# Patient Record
Sex: MALE | Race: OTHER | NOT HISPANIC OR LATINO | URBAN - METROPOLITAN AREA
[De-identification: names, ages, dates, MRNs, and addresses within clinical notes are randomized per-mention and may not be internally consistent; named-entity substitution may affect disease eponyms.]

---

## 2019-09-11 ENCOUNTER — INPATIENT (INPATIENT)
Facility: HOSPITAL | Age: 79
LOS: 8 days | Discharge: HOSPICE | End: 2019-09-20
Attending: INTERNAL MEDICINE | Admitting: INTERNAL MEDICINE
Payer: MEDICARE

## 2019-09-11 VITALS
TEMPERATURE: 98 F | RESPIRATION RATE: 24 BRPM | SYSTOLIC BLOOD PRESSURE: 122 MMHG | DIASTOLIC BLOOD PRESSURE: 60 MMHG | HEART RATE: 88 BPM | OXYGEN SATURATION: 97 %

## 2019-09-11 DIAGNOSIS — I10 ESSENTIAL (PRIMARY) HYPERTENSION: ICD-10-CM

## 2019-09-11 DIAGNOSIS — E11.9 TYPE 2 DIABETES MELLITUS WITHOUT COMPLICATIONS: ICD-10-CM

## 2019-09-11 DIAGNOSIS — R53.2 FUNCTIONAL QUADRIPLEGIA: ICD-10-CM

## 2019-09-11 DIAGNOSIS — N17.9 ACUTE KIDNEY FAILURE, UNSPECIFIED: ICD-10-CM

## 2019-09-11 DIAGNOSIS — R41.82 ALTERED MENTAL STATUS, UNSPECIFIED: ICD-10-CM

## 2019-09-11 DIAGNOSIS — R74.8 ABNORMAL LEVELS OF OTHER SERUM ENZYMES: ICD-10-CM

## 2019-09-11 DIAGNOSIS — G93.41 METABOLIC ENCEPHALOPATHY: ICD-10-CM

## 2019-09-11 DIAGNOSIS — F03.90 UNSPECIFIED DEMENTIA WITHOUT BEHAVIORAL DISTURBANCE: ICD-10-CM

## 2019-09-11 DIAGNOSIS — J18.9 PNEUMONIA, UNSPECIFIED ORGANISM: ICD-10-CM

## 2019-09-11 DIAGNOSIS — Z71.89 OTHER SPECIFIED COUNSELING: ICD-10-CM

## 2019-09-11 DIAGNOSIS — Z29.9 ENCOUNTER FOR PROPHYLACTIC MEASURES, UNSPECIFIED: ICD-10-CM

## 2019-09-11 DIAGNOSIS — R62.7 ADULT FAILURE TO THRIVE: ICD-10-CM

## 2019-09-11 DIAGNOSIS — D64.9 ANEMIA, UNSPECIFIED: ICD-10-CM

## 2019-09-11 DIAGNOSIS — R13.10 DYSPHAGIA, UNSPECIFIED: ICD-10-CM

## 2019-09-11 LAB
ALBUMIN SERPL ELPH-MCNC: 3 G/DL — LOW (ref 3.3–5)
ALP SERPL-CCNC: 78 U/L — SIGNIFICANT CHANGE UP (ref 40–120)
ALT FLD-CCNC: 10 U/L — SIGNIFICANT CHANGE UP (ref 4–41)
ANION GAP SERPL CALC-SCNC: 15 MMO/L — HIGH (ref 7–14)
APPEARANCE UR: CLEAR — SIGNIFICANT CHANGE UP
APTT BLD: 23.5 SEC — LOW (ref 27.5–36.3)
AST SERPL-CCNC: 11 U/L — SIGNIFICANT CHANGE UP (ref 4–40)
B PERT DNA SPEC QL NAA+PROBE: NOT DETECTED — SIGNIFICANT CHANGE UP
BACTERIA # UR AUTO: NEGATIVE — SIGNIFICANT CHANGE UP
BASE EXCESS BLDV CALC-SCNC: -4.6 MMOL/L — SIGNIFICANT CHANGE UP
BASOPHILS # BLD AUTO: 0.03 K/UL — SIGNIFICANT CHANGE UP (ref 0–0.2)
BASOPHILS NFR BLD AUTO: 0.3 % — SIGNIFICANT CHANGE UP (ref 0–2)
BILIRUB SERPL-MCNC: < 0.2 MG/DL — LOW (ref 0.2–1.2)
BILIRUB UR-MCNC: NEGATIVE — SIGNIFICANT CHANGE UP
BLOOD GAS VENOUS - CREATININE: 3.81 MG/DL — HIGH (ref 0.5–1.3)
BLOOD GAS VENOUS - FIO2: 21 — SIGNIFICANT CHANGE UP
BLOOD UR QL VISUAL: NEGATIVE — SIGNIFICANT CHANGE UP
BUN SERPL-MCNC: 70 MG/DL — HIGH (ref 7–23)
C PNEUM DNA SPEC QL NAA+PROBE: NOT DETECTED — SIGNIFICANT CHANGE UP
CALCIUM SERPL-MCNC: 8.5 MG/DL — SIGNIFICANT CHANGE UP (ref 8.4–10.5)
CHLORIDE BLDV-SCNC: 107 MMOL/L — SIGNIFICANT CHANGE UP (ref 96–108)
CHLORIDE SERPL-SCNC: 98 MMOL/L — SIGNIFICANT CHANGE UP (ref 98–107)
CO2 SERPL-SCNC: 19 MMOL/L — LOW (ref 22–31)
COLOR SPEC: YELLOW — SIGNIFICANT CHANGE UP
CREAT SERPL-MCNC: 3.8 MG/DL — HIGH (ref 0.5–1.3)
EOSINOPHIL # BLD AUTO: 0.02 K/UL — SIGNIFICANT CHANGE UP (ref 0–0.5)
EOSINOPHIL NFR BLD AUTO: 0.2 % — SIGNIFICANT CHANGE UP (ref 0–6)
FLUAV H1 2009 PAND RNA SPEC QL NAA+PROBE: NOT DETECTED — SIGNIFICANT CHANGE UP
FLUAV H1 RNA SPEC QL NAA+PROBE: NOT DETECTED — SIGNIFICANT CHANGE UP
FLUAV H3 RNA SPEC QL NAA+PROBE: NOT DETECTED — SIGNIFICANT CHANGE UP
FLUAV SUBTYP SPEC NAA+PROBE: NOT DETECTED — SIGNIFICANT CHANGE UP
FLUBV RNA SPEC QL NAA+PROBE: NOT DETECTED — SIGNIFICANT CHANGE UP
GAS PNL BLDV: 130 MMOL/L — LOW (ref 136–146)
GLUCOSE BLDC GLUCOMTR-MCNC: 142 MG/DL — HIGH (ref 70–99)
GLUCOSE BLDC GLUCOMTR-MCNC: 159 MG/DL — HIGH (ref 70–99)
GLUCOSE BLDC GLUCOMTR-MCNC: 189 MG/DL — HIGH (ref 70–99)
GLUCOSE BLDC GLUCOMTR-MCNC: 192 MG/DL — HIGH (ref 70–99)
GLUCOSE BLDC GLUCOMTR-MCNC: 227 MG/DL — HIGH (ref 70–99)
GLUCOSE BLDV-MCNC: 228 MG/DL — HIGH (ref 70–99)
GLUCOSE SERPL-MCNC: 252 MG/DL — HIGH (ref 70–99)
GLUCOSE UR-MCNC: 70 — SIGNIFICANT CHANGE UP
HADV DNA SPEC QL NAA+PROBE: NOT DETECTED — SIGNIFICANT CHANGE UP
HCO3 BLDV-SCNC: 20 MMOL/L — SIGNIFICANT CHANGE UP (ref 20–27)
HCOV PNL SPEC NAA+PROBE: SIGNIFICANT CHANGE UP
HCT VFR BLD CALC: 29.2 % — LOW (ref 39–50)
HCT VFR BLDV CALC: 29.3 % — LOW (ref 39–51)
HGB BLD-MCNC: 9.3 G/DL — LOW (ref 13–17)
HGB BLDV-MCNC: 9.5 G/DL — LOW (ref 13–17)
HMPV RNA SPEC QL NAA+PROBE: NOT DETECTED — SIGNIFICANT CHANGE UP
HPIV1 RNA SPEC QL NAA+PROBE: NOT DETECTED — SIGNIFICANT CHANGE UP
HPIV2 RNA SPEC QL NAA+PROBE: NOT DETECTED — SIGNIFICANT CHANGE UP
HPIV3 RNA SPEC QL NAA+PROBE: NOT DETECTED — SIGNIFICANT CHANGE UP
HPIV4 RNA SPEC QL NAA+PROBE: NOT DETECTED — SIGNIFICANT CHANGE UP
HYALINE CASTS # UR AUTO: NEGATIVE — SIGNIFICANT CHANGE UP
IMM GRANULOCYTES NFR BLD AUTO: 0.3 % — SIGNIFICANT CHANGE UP (ref 0–1.5)
INR BLD: 1.05 — SIGNIFICANT CHANGE UP (ref 0.88–1.17)
KETONES UR-MCNC: NEGATIVE — SIGNIFICANT CHANGE UP
LACTATE BLDV-MCNC: 1.6 MMOL/L — SIGNIFICANT CHANGE UP (ref 0.5–2)
LEUKOCYTE ESTERASE UR-ACNC: NEGATIVE — SIGNIFICANT CHANGE UP
LYMPHOCYTES # BLD AUTO: 2.02 K/UL — SIGNIFICANT CHANGE UP (ref 1–3.3)
LYMPHOCYTES # BLD AUTO: 21.8 % — SIGNIFICANT CHANGE UP (ref 13–44)
MCHC RBC-ENTMCNC: 27.5 PG — SIGNIFICANT CHANGE UP (ref 27–34)
MCHC RBC-ENTMCNC: 31.8 % — LOW (ref 32–36)
MCV RBC AUTO: 86.4 FL — SIGNIFICANT CHANGE UP (ref 80–100)
MONOCYTES # BLD AUTO: 0.96 K/UL — HIGH (ref 0–0.9)
MONOCYTES NFR BLD AUTO: 10.4 % — SIGNIFICANT CHANGE UP (ref 2–14)
NEUTROPHILS # BLD AUTO: 6.2 K/UL — SIGNIFICANT CHANGE UP (ref 1.8–7.4)
NEUTROPHILS NFR BLD AUTO: 67 % — SIGNIFICANT CHANGE UP (ref 43–77)
NITRITE UR-MCNC: NEGATIVE — SIGNIFICANT CHANGE UP
NRBC # FLD: 0 K/UL — SIGNIFICANT CHANGE UP (ref 0–0)
NT-PROBNP SERPL-SCNC: 1191 PG/ML — SIGNIFICANT CHANGE UP
PCO2 BLDV: 40 MMHG — LOW (ref 41–51)
PH BLDV: 7.33 PH — SIGNIFICANT CHANGE UP (ref 7.32–7.43)
PH UR: 6 — SIGNIFICANT CHANGE UP (ref 5–8)
PLATELET # BLD AUTO: 328 K/UL — SIGNIFICANT CHANGE UP (ref 150–400)
PMV BLD: 9.8 FL — SIGNIFICANT CHANGE UP (ref 7–13)
PO2 BLDV: 28 MMHG — LOW (ref 35–40)
POTASSIUM BLDV-SCNC: 4.1 MMOL/L — SIGNIFICANT CHANGE UP (ref 3.4–4.5)
POTASSIUM SERPL-MCNC: 4.4 MMOL/L — SIGNIFICANT CHANGE UP (ref 3.5–5.3)
POTASSIUM SERPL-SCNC: 4.4 MMOL/L — SIGNIFICANT CHANGE UP (ref 3.5–5.3)
PROT SERPL-MCNC: 6.1 G/DL — SIGNIFICANT CHANGE UP (ref 6–8.3)
PROT UR-MCNC: 200 — HIGH
PROTHROM AB SERPL-ACNC: 11.7 SEC — SIGNIFICANT CHANGE UP (ref 9.8–13.1)
RBC # BLD: 3.38 M/UL — LOW (ref 4.2–5.8)
RBC # FLD: 13.4 % — SIGNIFICANT CHANGE UP (ref 10.3–14.5)
RBC CASTS # UR COMP ASSIST: SIGNIFICANT CHANGE UP (ref 0–?)
RSV RNA SPEC QL NAA+PROBE: NOT DETECTED — SIGNIFICANT CHANGE UP
RV+EV RNA SPEC QL NAA+PROBE: NOT DETECTED — SIGNIFICANT CHANGE UP
SAO2 % BLDV: 41.8 % — LOW (ref 60–85)
SODIUM SERPL-SCNC: 132 MMOL/L — LOW (ref 135–145)
SP GR SPEC: 1.01 — SIGNIFICANT CHANGE UP (ref 1–1.04)
SQUAMOUS # UR AUTO: SIGNIFICANT CHANGE UP
TROPONIN T, HIGH SENSITIVITY: 120 NG/L — CRITICAL HIGH (ref ?–14)
TROPONIN T, HIGH SENSITIVITY: 131 NG/L — CRITICAL HIGH (ref ?–14)
UROBILINOGEN FLD QL: NORMAL — SIGNIFICANT CHANGE UP
WBC # BLD: 9.26 K/UL — SIGNIFICANT CHANGE UP (ref 3.8–10.5)
WBC # FLD AUTO: 9.26 K/UL — SIGNIFICANT CHANGE UP (ref 3.8–10.5)
WBC UR QL: SIGNIFICANT CHANGE UP (ref 0–?)

## 2019-09-11 PROCEDURE — 99223 1ST HOSP IP/OBS HIGH 75: CPT | Mod: GC

## 2019-09-11 PROCEDURE — 70450 CT HEAD/BRAIN W/O DYE: CPT | Mod: 26

## 2019-09-11 PROCEDURE — 99233 SBSQ HOSP IP/OBS HIGH 50: CPT | Mod: GC,25

## 2019-09-11 PROCEDURE — 71045 X-RAY EXAM CHEST 1 VIEW: CPT | Mod: 26

## 2019-09-11 RX ORDER — ONDANSETRON 8 MG/1
4 TABLET, FILM COATED ORAL EVERY 6 HOURS
Refills: 0 | Status: DISCONTINUED | OUTPATIENT
Start: 2019-09-11 | End: 2019-09-20

## 2019-09-11 RX ORDER — FUROSEMIDE 40 MG
20 TABLET ORAL ONCE
Refills: 0 | Status: COMPLETED | OUTPATIENT
Start: 2019-09-11 | End: 2019-09-11

## 2019-09-11 RX ORDER — SODIUM CHLORIDE 9 MG/ML
500 INJECTION INTRAMUSCULAR; INTRAVENOUS; SUBCUTANEOUS ONCE
Refills: 0 | Status: COMPLETED | OUTPATIENT
Start: 2019-09-11 | End: 2019-09-11

## 2019-09-11 RX ORDER — IPRATROPIUM/ALBUTEROL SULFATE 18-103MCG
3 AEROSOL WITH ADAPTER (GRAM) INHALATION EVERY 6 HOURS
Refills: 0 | Status: DISCONTINUED | OUTPATIENT
Start: 2019-09-11 | End: 2019-09-20

## 2019-09-11 RX ORDER — PIPERACILLIN AND TAZOBACTAM 4; .5 G/20ML; G/20ML
3.38 INJECTION, POWDER, LYOPHILIZED, FOR SOLUTION INTRAVENOUS ONCE
Refills: 0 | Status: COMPLETED | OUTPATIENT
Start: 2019-09-11 | End: 2019-09-11

## 2019-09-11 RX ORDER — HEPARIN SODIUM 5000 [USP'U]/ML
5000 INJECTION INTRAVENOUS; SUBCUTANEOUS EVERY 8 HOURS
Refills: 0 | Status: DISCONTINUED | OUTPATIENT
Start: 2019-09-11 | End: 2019-09-20

## 2019-09-11 RX ORDER — PANTOPRAZOLE SODIUM 20 MG/1
40 TABLET, DELAYED RELEASE ORAL DAILY
Refills: 0 | Status: DISCONTINUED | OUTPATIENT
Start: 2019-09-11 | End: 2019-09-20

## 2019-09-11 RX ORDER — SODIUM CHLORIDE 9 MG/ML
1000 INJECTION, SOLUTION INTRAVENOUS
Refills: 0 | Status: DISCONTINUED | OUTPATIENT
Start: 2019-09-11 | End: 2019-09-20

## 2019-09-11 RX ORDER — SODIUM CHLORIDE 9 MG/ML
1000 INJECTION INTRAMUSCULAR; INTRAVENOUS; SUBCUTANEOUS
Refills: 0 | Status: DISCONTINUED | OUTPATIENT
Start: 2019-09-11 | End: 2019-09-11

## 2019-09-11 RX ORDER — DEXTROSE 50 % IN WATER 50 %
15 SYRINGE (ML) INTRAVENOUS ONCE
Refills: 0 | Status: DISCONTINUED | OUTPATIENT
Start: 2019-09-11 | End: 2019-09-20

## 2019-09-11 RX ORDER — INSULIN LISPRO 100/ML
VIAL (ML) SUBCUTANEOUS EVERY 6 HOURS
Refills: 0 | Status: DISCONTINUED | OUTPATIENT
Start: 2019-09-11 | End: 2019-09-20

## 2019-09-11 RX ORDER — DEXAMETHASONE 0.5 MG/5ML
8 ELIXIR ORAL EVERY 8 HOURS
Refills: 0 | Status: COMPLETED | OUTPATIENT
Start: 2019-09-11 | End: 2019-09-12

## 2019-09-11 RX ORDER — DEXTROSE 50 % IN WATER 50 %
25 SYRINGE (ML) INTRAVENOUS ONCE
Refills: 0 | Status: DISCONTINUED | OUTPATIENT
Start: 2019-09-11 | End: 2019-09-20

## 2019-09-11 RX ORDER — GLUCAGON INJECTION, SOLUTION 0.5 MG/.1ML
1 INJECTION, SOLUTION SUBCUTANEOUS ONCE
Refills: 0 | Status: DISCONTINUED | OUTPATIENT
Start: 2019-09-11 | End: 2019-09-20

## 2019-09-11 RX ORDER — PIPERACILLIN AND TAZOBACTAM 4; .5 G/20ML; G/20ML
3.38 INJECTION, POWDER, LYOPHILIZED, FOR SOLUTION INTRAVENOUS EVERY 12 HOURS
Refills: 0 | Status: COMPLETED | OUTPATIENT
Start: 2019-09-11 | End: 2019-09-16

## 2019-09-11 RX ORDER — DEXTROSE 50 % IN WATER 50 %
12.5 SYRINGE (ML) INTRAVENOUS ONCE
Refills: 0 | Status: DISCONTINUED | OUTPATIENT
Start: 2019-09-11 | End: 2019-09-20

## 2019-09-11 RX ADMIN — Medication 3 MILLILITER(S): at 21:40

## 2019-09-11 RX ADMIN — HEPARIN SODIUM 5000 UNIT(S): 5000 INJECTION INTRAVENOUS; SUBCUTANEOUS at 22:13

## 2019-09-11 RX ADMIN — Medication 101.6 MILLIGRAM(S): at 22:13

## 2019-09-11 RX ADMIN — Medication 3 MILLILITER(S): at 12:08

## 2019-09-11 RX ADMIN — ONDANSETRON 4 MILLIGRAM(S): 8 TABLET, FILM COATED ORAL at 08:17

## 2019-09-11 RX ADMIN — Medication 2: at 23:45

## 2019-09-11 RX ADMIN — Medication 1: at 12:08

## 2019-09-11 RX ADMIN — Medication 3 MILLILITER(S): at 06:44

## 2019-09-11 RX ADMIN — Medication 1: at 18:27

## 2019-09-11 RX ADMIN — PIPERACILLIN AND TAZOBACTAM 25 GRAM(S): 4; .5 INJECTION, POWDER, LYOPHILIZED, FOR SOLUTION INTRAVENOUS at 18:28

## 2019-09-11 RX ADMIN — HEPARIN SODIUM 5000 UNIT(S): 5000 INJECTION INTRAVENOUS; SUBCUTANEOUS at 14:30

## 2019-09-11 RX ADMIN — PIPERACILLIN AND TAZOBACTAM 200 GRAM(S): 4; .5 INJECTION, POWDER, LYOPHILIZED, FOR SOLUTION INTRAVENOUS at 05:59

## 2019-09-11 RX ADMIN — SODIUM CHLORIDE 100 MILLILITER(S): 9 INJECTION INTRAMUSCULAR; INTRAVENOUS; SUBCUTANEOUS at 08:06

## 2019-09-11 RX ADMIN — Medication 20 MILLIGRAM(S): at 08:17

## 2019-09-11 RX ADMIN — SODIUM CHLORIDE 500 MILLILITER(S): 9 INJECTION INTRAMUSCULAR; INTRAVENOUS; SUBCUTANEOUS at 05:34

## 2019-09-11 RX ADMIN — SODIUM CHLORIDE 100 MILLILITER(S): 9 INJECTION INTRAMUSCULAR; INTRAVENOUS; SUBCUTANEOUS at 07:19

## 2019-09-11 NOTE — H&P ADULT - ATTENDING COMMENTS
Patient seen and examined. Case discussed with house staff. Agree with resident note above as edited.    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     On exam, afebrile, HR 80s, -150s  Gen: sleeping, audible upper airway sounds   Pulm: course breath sounds and rhonchi bilaterally   ENT: Pooling secretions in mouth   CV: RRR, S1/S2  Abd: soft, non-tender  MSK: no LE edema of cyanosis   Neuro: A&Ox0, sleeping, moving to tactile stimuli, unarousable to verbal stimuli, not following commands.     #Metabolic Encephalopathy   - Per granddaughter at baseline patient is talkative, able to recognize family members and can walk to bathroom  - Etiology: CVA event vs. aspiration event vs. infectious   - Possible CVA event: CT head unremarkable, but at this time patient will not be able to tolerate MRI due to secretions and respiratory distress. Unable to get CTA H/N due to elevated Cr.   - Patient afebrile, UA negative, CXR negative. Will c/w Zosyn for now given high likelihood of aspiration.     #Dysphagia/Aspiration   - Patient w/ pooling secretions. MICU consulted, no need to MICU at this time.  - NPO, aspiration precautions.   - ENT consulted for possible scope to r/o edema  - S/S eval   - C/w Zosyn given concern for aspiration event     #Elevated Troponin   - Likely in setting of CKD   - Downtrending  - EKG without acute ANA or TWI, no prior for comparison    #GOC - patient is full code at this time per discussion with granddaughter.

## 2019-09-11 NOTE — CONSULT NOTE ADULT - SUBJECTIVE AND OBJECTIVE BOX
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 MICU consulted for evaluation.     HPI per chart:  "Patient is usually AAOx1 and will respond to verbal stimulation but as of 4 days ago they noticed that he was "gurgling" and not as responsive as usual. Per the granddaughter, a PA from [facility] came to the house on Saturday and did a CXR which was clear. His symptoms continued so they brought him in for evaluation. Does not ambulate, relies on others for feeding but eating food. No fevers or chills at home."    In the ED, afebrile, (+)gag reflex, responsive to pain. Troponins to 130 trended down to 120 in setting of ESRD and likely infection, low likelihood of CNS, EKG NSR, CXR clear lungs, CTH no acute findings, UA negative, Cr 3.8 (baseline unknown), no leukocytosis.     Vital Signs Last 24 Hrs  T(C): 36.6 (09-11-19 @ 11:55), Max: 37.3 (09-11-19 @ 01:03)  T(F): 97.9 (09-11-19 @ 11:55), Max: 99.2 (09-11-19 @ 01:03)  HR: 82 (09-11-19 @ 11:55) (73 - 89)  BP: 178/67 (09-11-19 @ 11:55) (122/60 - 185/63)  BP(mean): --  RR: 19 (09-11-19 @ 11:55) (16 - 24)  SpO2: 98% (09-11-19 @ 11:55) (97% - 100%)    Physical Exam:  GENERAL: Sitting up in bed, uncomfortable, gurgles frequently  HEENT: , no oropharyngeal lesions or erythema appreciated  Pulm: normal work of breathing, CTABL  CV: RRR, S1&S2+, no m/r/g appreciated  ABDOMEN: soft, nt, nd, no hepatosplenomegaly  MSK: nl ROM  EXTREMITIES:  no appreciable edema in b/l LE  Neuro: A&Ox3, no focal deficits  SKIN: warm and dry, no visible rash The patient is an 80y/o man with alzheimer's dementia, HTN, PVD, CKD stage III, DM2, HLD, BPH, 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 MICU consulted for evaluation.     HPI per chart:  "Patient is usually AAOx1 and will respond to verbal stimulation but as of 4 days ago they noticed that he was "gurgling" and not as responsive as usual. Per the granddaughter, a PA from [facility] came to the house on Saturday and did a CXR which was clear. His symptoms continued so they brought him in for evaluation. Does not ambulate, relies on others for feeding but eating food. No fevers or chills at home."    In the ED, afebrile, (+)gag reflex, responsive to pain. Troponins to 130 trended down to 120 in setting of ESRD and likely infection, low likelihood of CNS, EKG NSR, CXR clear lungs, CTH no acute findings, UA negative, Cr 3.8 (baseline unknown), no leukocytosis.     Vital Signs Last 24 Hrs  T(C): 36.6 (19 @ 11:55), Max: 37.3 (19 @ 01:03)  T(F): 97.9 (19 @ 11:55), Max: 99.2 (19 @ 01:03)  HR: 82 (19 @ 11:55) (73 - 89)  BP: 178/67 (19 @ 11:55) (122/60 - 185/63)  BP(mean): --  RR: 19 (19 @ 11:55) (16 - 24)  SpO2: 98% (19 @ 11:55) (97% - 100%)    Physical Exam:  GENERAL: Sitting up in bed, uncomfortable, gurgles frequently  HEENT: Poor dentition, patient not responding to commands appropriately, b/l blindness, sclera white, iris/pupil not visible on the right  Pulm: Patient gurgling, lungs clear in anterior fields, upper airway course  CV: RRR, S1&S2+, no m/r/g appreciated  ABDOMEN: soft, nt, nd, no hepatosplenomegaly, BS+  EXTREMITIES:  no appreciable edema in b/l LE  Neuro: A&Ox0, unable to further assess  SKIN: warm and dry, no visible rash                            9.3    9.26  )-----------( 328      ( 11 Sep 2019 01:00 )             29.2           132<L>  |  98  |  70<H>  ----------------------------<  252<H>  4.4   |  19<L>  |  3.80<H>    Ca    8.5      11 Sep 2019 01:00    TPro  6.1  /  Alb  3.0<L>  /  TBili  < 0.2<L>  /  DBili  x   /  AST  11  /  ALT  10  /  AlkPhos  78                Urinalysis Basic - ( 11 Sep 2019 01:45 )    Color: YELLOW / Appearance: CLEAR / S.015 / pH: 6.0  Gluc: 70 / Ketone: NEGATIVE  / Bili: NEGATIVE / Urobili: NORMAL   Blood: NEGATIVE / Protein: 200 / Nitrite: NEGATIVE   Leuk Esterase: NEGATIVE / RBC: 0-2 / WBC 3-5   Sq Epi: OCC / Non Sq Epi: x / Bacteria: NEGATIVE        PT/INR - ( 11 Sep 2019 01:00 )   PT: 11.7 SEC;   INR: 1.05          PTT - ( 11 Sep 2019 01:00 )  PTT:23.5 SEC    Lactate Trend            CAPILLARY BLOOD GLUCOSE      POCT Blood Glucose.: 192 mg/dL (11 Sep 2019 14:14)      EKG and Imaging reviewed. 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 MICU consulted for evaluation.     HPI per chart:  "Patient is usually AAOx1 and will respond to verbal stimulation but as of 4 days ago they noticed that he was "gurgling" and not as responsive as usual. Per the granddaughter, a PA from [facility] came to the house on Saturday and did a CXR which was clear. His symptoms continued so they brought him in for evaluation. Does not ambulate, relies on others for feeding but eating food. No fevers or chills at home."    In the ED, afebrile, (+)gag reflex, responsive to pain. Troponins to 130 trended down to 120 in setting of ESRD and likely infection, low likelihood of CNS, EKG NSR, CXR clear lungs, CTH no acute findings, UA negative, Cr 3.8 (baseline unknown), no leukocytosis.     ROS:  no fevers  possible chills  +throat pain  +oral secretions  all other systems reviewed are negative    PAST MEDICAL & SURGICAL HISTORY:  HLD (hyperlipidemia)  Blindness  Chronic kidney disease (CKD)  Alzheimer disease  PVD (peripheral vascular disease)  BPH (benign prostatic hyperplasia)  DM (diabetes mellitus): retinopathy  HTN (hypertension)    FAMILY HISTORY:  no sig fam history in mother or father    Social History:   non smoker  does not drink etoh    Vital Signs Last 24 Hrs  T(C): 36.6 (19 @ 11:55), Max: 37.3 (19 @ 01:03)  T(F): 97.9 (19 @ 11:55), Max: 99.2 (19 @ 01:03)  HR: 82 (19 @ 11:55) (73 - 89)  BP: 178/67 (19 @ 11:55) (122/60 - 185/63)  BP(mean): --  RR: 19 (19 @ 11:55) (16 - 24)  SpO2: 98% (19 @ 11:55) (97% - 100%)    Physical Exam:  GENERAL: Sitting up in bed, uncomfortable, gurgles frequently  HEENT: Poor dentition, patient not responding to commands appropriately, b/l blindness, sclera white, iris/pupil not visible on the right  Pulm: Patient gurgling, lungs clear in anterior fields, upper airway course  CV: RRR, S1&S2+, no m/r/g appreciated  ABDOMEN: soft, nt, nd, no hepatosplenomegaly, BS+  EXTREMITIES:  no appreciable edema in b/l LE  Neuro: A&Ox0, unable to further assess  SKIN: warm and dry, no visible rash                            9.3    9.26  )-----------( 328      ( 11 Sep 2019 01:00 )             29.2           132<L>  |  98  |  70<H>  ----------------------------<  252<H>  4.4   |  19<L>  |  3.80<H>    Ca    8.5      11 Sep 2019 01:00    TPro  6.1  /  Alb  3.0<L>  /  TBili  < 0.2<L>  /  DBili  x   /  AST  11  /  ALT  10  /  AlkPhos  78  -              Urinalysis Basic - ( 11 Sep 2019 01:45 )    Color: YELLOW / Appearance: CLEAR / S.015 / pH: 6.0  Gluc: 70 / Ketone: NEGATIVE  / Bili: NEGATIVE / Urobili: NORMAL   Blood: NEGATIVE / Protein: 200 / Nitrite: NEGATIVE   Leuk Esterase: NEGATIVE / RBC: 0-2 / WBC 3-5   Sq Epi: OCC / Non Sq Epi: x / Bacteria: NEGATIVE        PT/INR - ( 11 Sep 2019 01:00 )   PT: 11.7 SEC;   INR: 1.05          PTT - ( 11 Sep 2019 01:00 )  PTT:23.5 SEC    Lactate Trend            CAPILLARY BLOOD GLUCOSE      POCT Blood Glucose.: 192 mg/dL (11 Sep 2019 14:14)      EKG and Imaging reviewed.

## 2019-09-11 NOTE — H&P ADULT - PROBLEM SELECTOR PLAN 2
Initial 131, repeat in 1.5 hrs is 120. Likely elevated w/ pt's hx of CKD. EKG showing NSR and non specific t wave abnormalities; no ST elevations or depressions. BNP 1191; given pt's age, not highly concerning. However, must r/o CHF  - s/p lasix 20mg  - echo ordered hx of dysphagia, on pureed diet at home. drooling on presentation, seems unable to swallow secretions.   - concern for inability to protect airway; MICU evaluated pt, not MICU candidate; ENT consulted  - speech and swallow to see pt  - NPO for now, aspiration precautions  - zofran PRN for nausea

## 2019-09-11 NOTE — ED ADULT NURSE NOTE - CHIEF COMPLAINT QUOTE
Pt arrives , Non verbal, excessive drooling , hiccupping, difficulty clearing secreations, lethargic. As per dawnater" for past couple of days he as been drooling and very weak....[facility] came to house did xrays they said the chest xray was ok...they took him off Alzhiemers meds ....but he usually can talk past 3 days stopped talking...and his breathing seems labored." +Blind

## 2019-09-11 NOTE — H&P ADULT - NSHPREVIEWOFSYSTEMS_GEN_ALL_CORE
REVIEW OF SYSTEMS: as per granddaughter at bedside prior to symptom onset    CONSTITUTIONAL: No weakness, fevers or chills  EYES/ENT: No visual changes;  No vertigo or throat pain   NECK: No pain or stiffness  RESPIRATORY: No cough, wheezing, hemoptysis; No shortness of breath  CARDIOVASCULAR: No chest pain or palpitations  GASTROINTESTINAL: No abdominal or epigastric pain. No nausea, vomiting, or hematemesis; No diarrhea or constipation. No melena or hematochezia.  GENITOURINARY: No dysuria, frequency or hematuria  NEUROLOGICAL: No numbness or weakness  SKIN: No itching, burning, rashes, or lesions   All other review of systems is negative unless indicated above. REVIEW OF SYSTEMS: pt unable to answer due to mental status  All other review of systems is negative unless indicated above.

## 2019-09-11 NOTE — CONSULT NOTE ADULT - ASSESSMENT
A/P: 79 year old male with Alzheimer's disease p/w acute AMS and decreased PO intake for the past 3 days. ENT consulted for scope evaluation which showed poor respiratory effort due to AMS and also pooling of secretions over the glottis. No masses/lesions seen. Patient's dysphagia and AMS are likely 2/2 medications vs. sepsis vs. progression of disease. He is not protecting his airway well as secretions are pooling over the glottis. Pending further workup by medicine team.   -no acute ORL intervention  -recommend NPO until mental status improves  -SLP re-eval once mental status returns  -can trial decadron IV 8mg q8hrs x3 doses  -consider PPI   -suction at the bedside, routine suctioning of patient, can also use soft suction catheter in the mouth and nose as well if Yankauer suction is not going down deep enough   -if patient decompensates, can intubate orally   -will d/w attending. Findings conveyed to primary team  -please call with questions

## 2019-09-11 NOTE — ED ADULT NURSE NOTE - OBJECTIVE STATEMENT
Pt received in rm 18, 79Y M Aox1 at baseline. Pt granddaughter at bedside wit home health aid. Pmhx alzheimer, CKD, blindness. Pt granddaughter reports pt has increased gurgling and has become less responsiveness to stimuli. Pt family reports pt is usually baseline AOx1 but more verbal. Pt arrives to room gurgling with noted drooling. MD Glass at bedside at this time for eval. Pt placed on cardiac monitor, O2 sat 90s on RA placed on 3L NC O2 sat 100%. Pt orally suctioned to clear secretions. IV placed 20G Lft AC, labs sent as ordered, VS as charted- rectal 99.2F. Pt to have portable cxr at this time, will continue to monitor. Pt received in rm 18, 79Y M Aox1 at baseline. Pt granddaughter at bedside wit home health aid. Pmhx alzheimer, CKD, blindness. Pt granddaughter reports pt has increased gurgling and has become less responsiveness to stimuli. Pt family reports pt is usually baseline AOx1 but more verbal. Pt arrives to room gurgling with noted drooling. MD Glass at bedside at this time for eval. Pt placed on cardiac monitor, O2 sat 90s on RA placed on 3L NC O2 sat 100%. Pt having difficulty clearing secretions. Pt orally suctioned to clear secretions. IV placed 20G Lft AC, labs sent as ordered, VS as charted- rectal 99.2F. Pt to have portable cxr at this time, will continue to monitor. Pt received in rm 18, 79Y M Aox1 at baseline. Pt granddaughter at bedside wit home health aid. Pmhx alzheimer, CKD, blindness. Pt granddaughter reports pt has increased gurgling and has become less responsiveness to stimuli. Pt family reports pt is usually baseline AOx1 but more verbal. Pt arrives to room gurgling with noted drooling. MD Glass at bedside at this time for eval. Pt placed on cardiac monitor, O2 sat 90s on RA placed on 3L NC O2 sat 100%. Pt having difficulty clearing secretions. Pt orally suctioned to clear secretions. IV placed 20G Lft AC, labs sent as ordered, VS as charted- rectal 99.2F. Skin clean dry and intact. Pt to have portable cxr at this time, will continue to monitor.

## 2019-09-11 NOTE — H&P ADULT - NSHPPHYSICALEXAM_GEN_ALL_CORE
PHYSICAL EXAM:  GENERAL: thin male, breathing uncomfortably   HEAD:  Atraumatic, Normocephalic  EYES: EOMI, and sclera clear  NECK: Supple, No JVD  MOUTH: drooling  CHEST/LUNG: harsh rhonchi throughout all lung fields; No wheeze  HEART: Regular rate and rhythm; No murmurs, rubs, or gallops  ABDOMEN: Soft, Nontender, Nondistended; Bowel sounds present  EXTREMITIES: No clubbing, cyanosis, or edema  PSYCH: AAOx0 Vital Signs Last 24 Hrs  T(C): 36.6 (11 Sep 2019 11:55), Max: 37.3 (11 Sep 2019 01:03)  T(F): 97.9 (11 Sep 2019 11:55), Max: 99.2 (11 Sep 2019 01:03)  HR: 82 (11 Sep 2019 11:55) (73 - 89)  BP: 178/67 (11 Sep 2019 11:55) (122/60 - 185/63)  BP(mean): --  RR: 19 (11 Sep 2019 11:55) (16 - 24)  SpO2: 98% (11 Sep 2019 11:55) (97% - 100%)    PHYSICAL EXAM:  GENERAL: thin male, breathing uncomfortably   HEAD:  Atraumatic, Normocephalic  EYES: sclera clear  NECK: Supple, No JVD  MOUTH: drooling and pooling secretions   CHEST/LUNG: harsh rhonchi throughout all lung fields; No wheeze  HEART: Regular rate and rhythm; No murmurs, rubs, or gallops  ABDOMEN: Soft, Nontender, Nondistended; Bowel sounds present  EXTREMITIES: No clubbing, cyanosis, or edema  PSYCH: AAOx0  NEURO: Contracted extremities. Not following commands. A&Ox0

## 2019-09-11 NOTE — H&P ADULT - PROBLEM SELECTOR PLAN 4
Hgb 9.3. Unknown baseline. Likely due to anemia of chronic disease.  - obtain iron studies On Humulin 70/30 at home with 25 units in AM and 15 units in evening. Last .  - pt NPO, FS and ISS q 6

## 2019-09-11 NOTE — CONSULT NOTE ADULT - ATTENDING COMMENTS
Patient with history as above, now with acute hypoxic respiratory failure possibly due to URI, increased oral secretions.  Grand daughter at bedside says that patient's throat has been hurting him.  He is protecting his airway, oxygen saturation is adequate.  Patient does not require MICU level of care at this time.  Please re-consult as needed.  Recommend NT suctioning, ENT eval.    Patient is not a candidate for MICU at this time. Please reconsult should the patient's respiratory status change or he become hemodynamically unstable.     PMH, PSH, family history, social history, and medication history all reviewed.

## 2019-09-11 NOTE — ED PROVIDER NOTE - ATTENDING CONTRIBUTION TO CARE
78 y/o M with h/o blindness, HTN, DM, Alzheimer's dementia, CKD BIB family for AMS.  Pt noted to have decreased responsiveness since Sunday.  Also noted to be making gurgling noises.  He had a visiting PA come to the house on Sunday, had a CXR done which was normal.  Pt also noted to have decreased PO intake over the past few days.  At baseline A&Ox1 (self), mostly bedbound and dependent of all ADLs.  No fever, cough, apparent pain, vomiting, diarrhea, rash.  Pt sitting in stretcher, responsive to pain, gag reflex, nontoxic.  AF/VSS.  NCAT neck supple.  Lungs cta bl with transmitted upper airway noises.  Cards nl S1/S2, RRR, no MRG.  Abd soft ntnd.  No pedal edema or calf tenderness.  No focal neuro deficits.  Plan for ekg, labs, cxr, ct head, ua, admit.  Consider underlying aspiration vs infection vs metabolic disturbance vs primary cns.

## 2019-09-11 NOTE — H&P ADULT - PROBLEM SELECTOR PLAN 5
Hgb 9.3. Unknown baseline. Likely due to anemia of chronic disease w/ hx of CKD.  - obtain iron studies

## 2019-09-11 NOTE — CONSULT NOTE ADULT - SUBJECTIVE AND OBJECTIVE BOX
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 He is currently non verbal and lethargic in the ED, his daughter, granddaughter, and wife are with him and helped provide the history. They state that his symptoms all began after starting a new medication for Alzheimer. They state that after starting this medication, he has been sleepy and not talking as much. Also refusing to eat, barely taking any liquids. Having some coughing/choking with foods. No respiratory distress, but having "gurgling" with breathing. They are also worried that he is having "chest spasms" which cause his throat and lungs to be "paralyzed." They state prior to his AMS, he was complaining of a sore throat.   Seen by SLP today and recommended continued NPO given mental status.   No fevers, no leukocytosis (WBC 9 on labs today). Patient currently undergoing a sepsis workup.   CT head no acute findings.     ENT consulted for scope evaluation of airway.      Physical Exam  T(C): 36.8 (09-11-19 @ 14:54), Max: 37.3 (09-11-19 @ 01:03)  HR: 92 (09-11-19 @ 14:54) (73 - 92)  BP: 110/90 (09-11-19 @ 14:54) (110/90 - 185/63)  RR: 19 (09-11-19 @ 14:54) (16 - 24)  SpO2: 100% (09-11-19 @ 14:54) (97% - 100%)  General: NAD, lethargic, arousable to noxious stimuli but non-verbal, non-responsive to speech. AAOx0  No respiratory distress, stridor, or stertor, but does have gurgling noise with respirations   Voice quality: non-verbal, but grunting and occasional sounds have good volume and tone   Face:  Symmetric without masses or lesions  OU: bilateral blindness   Nose: nasal cavity clear anteriorly  OC/OP: tongue normal, pooling of secretions in OP   Neck: soft/flat, no LAD    Procedure: Flexible laryngoscopy    Pre-procedure diagnosis/Indication for procedure: To evaluate larynx    Anesthesia: None    Description:    A flexible endoscope was used to examine the left and right nasal cavities, posterior oropharynx, hypopharynx, and larynx. The nasal valve areas were examined for abnormalities or collapse. The inferior and middle turbinates were evaluated. The middle and superior meatuses, the sphenoethmoid recesses, and the nasopharynx were examined and inspected for mucopurulence, polyps, and nasal masses. The oropharynx, tongue base/vallecula, epiglottis, aryepiglottic folds, arytenoids, vocal cords, hypopharynx were also inspected for swelling, inflammation, or dysfunction. The patient tolerated the procedure without complications.    Findings:     NP wnl, pooling of copious clear secretions in the NP and all the way down to the level of the larynx.   BOT/vallecula normal  Epiglottis sharp  AE folds nonedematous  Exam of laryngeal function limited by lack of patient cooperation, does not follow commands. But on inspiration, poor effort so minimal abduction visualized.  Good adduction. No masses or lesions seen.   Arytenoids mobile, again mobility limited by poor patient effort   Mild edema of the arytenoids.   Vocal folds mobile bilaterally  No masses or lesions visualized in post cricoid space or pyriform sinuses bilaterally

## 2019-09-11 NOTE — H&P ADULT - PROBLEM SELECTOR PLAN 3
Cr 3.8. Unknown baseline. Pt has hx of CKD stage 3.  - IVF  - monitor Cr Cr 3.8. Unknown baseline. Pt has hx of CKD stage 3.  - will reach out to outpt PCP  - monitor Cr Cr 3.8. Unknown baseline. Pt has hx of CKD stage 3.  - will reach out to outpt PCP  - bladder scan to see if pt is retaining  - monitor Cr

## 2019-09-11 NOTE — H&P ADULT - PROBLEM SELECTOR PLAN 9
Discussed with granddaughter about code status. Currently full code. Encouraged discussion of pt's long term care plan with grandmother (pt's wife/HCP).  - will have another discussion with family

## 2019-09-11 NOTE — H&P ADULT - NSHPLABSRESULTS_GEN_ALL_CORE
LABS:                        9.3    9.26  )-----------( 328      ( 11 Sep 2019 01:00 )             29.2         132<L>  |  98  |  70<H>  ----------------------------<  252<H>  4.4   |  19<L>  |  3.80<H>    Ca    8.5      11 Sep 2019 01:00    TPro  6.1  /  Alb  3.0<L>  /  TBili  < 0.2<L>  /  DBili  x   /  AST  11  /  ALT  10  /  AlkPhos  78      LIVER FUNCTIONS - ( 11 Sep 2019 01:00 )  Alb: 3.0 g/dL / Pro: 6.1 g/dL / ALK PHOS: 78 u/L / ALT: 10 u/L / AST: 11 u/L / GGT: x           PT/INR - ( 11 Sep 2019 01:00 )   PT: 11.7 SEC;   INR: 1.05          PTT - ( 11 Sep 2019 01:00 )  PTT:23.5 SEC  Urinalysis Basic - ( 11 Sep 2019 01:45 )    Color: YELLOW / Appearance: CLEAR / S.015 / pH: 6.0  Gluc: 70 / Ketone: NEGATIVE  / Bili: NEGATIVE / Urobili: NORMAL   Blood: NEGATIVE / Protein: 200 / Nitrite: NEGATIVE   Leuk Esterase: NEGATIVE / RBC: 0-2 / WBC 3-5   Sq Epi: OCC / Non Sq Epi: x / Bacteria: NEGATIVE        Venous Blood Gas:   @ 01:00  7.33/40/28/20/41.8  VBG Lactate: 1.6      Urinalysis Basic - ( 11 Sep 2019 01:45 )    Color: YELLOW / Appearance: CLEAR / S.015 / pH: 6.0  Gluc: 70 / Ketone: NEGATIVE  / Bili: NEGATIVE / Urobili: NORMAL   Blood: NEGATIVE / Protein: 200 / Nitrite: NEGATIVE   Leuk Esterase: NEGATIVE / RBC: 0-2 / WBC 3-5   Sq Epi: OCC / Non Sq Epi: x / Bacteria: NEGATIVE        MICROBIOLOGY:   no new micro    RADIOLOGY & ADDITIONAL TESTS:  < from: Xray Chest 1 View-PORTABLE IMMEDIATE (19 @ 01:00) >    EXAM:  XR CHEST PORTABLE IMMED 1V      PROCEDURE DATE:  Sep 11 2019     INTERPRETATION:  HISTORY: Shortness of breath    TECHNIQUE: Portable frontal chest x-ray    COMPARISON: Chest x-ray from 2013    FINDINGS:    No focal consolidation.  There is no pneumothorax. There are no pleural effusions.   The cardiomediastinal silhouette cannot be adequately assessed on this   projection.    IMPRESSION:   Clear lungs.       < end of copied text >        CONSULTS:  consult notes reviewed and discussed with respective teams

## 2019-09-11 NOTE — ED PROVIDER NOTE - OBJECTIVE STATEMENT
79 M with Hx of Alzheimer's, DM, blindness, CKD who presents with gurgling. Per granddaughter and Aide, patient is usually AAOx1 and will respond to verbal stimulation but as of 4 days ago they noticed that he was "gurgling" and not as responsive as usual. Per the granddaughter, a PA from [facility] came to the house on Saturday and did a CXR which was clear. His symptoms continued so they brought him in for evaluation. Does not ambulate, relies on others for feeding but eating food. No fevers or chills at home.

## 2019-09-11 NOTE — H&P ADULT - PROBLEM SELECTOR PLAN 1
Likely 2/2 aspiration PNA vs. pneumonitis. HD stable, O2 sating well. Clinically improve with suctioning  - concern for inability to protect airway; MICU and ENT consulted  - c/w PRN suctioning  - on zosyn, renally dosed Likely 2/2 aspiration PNA vs. pneumonitis vs CVA vs. cardiac etiology. HD stable, O2 sating well. Clinically improve with suctioning.  - on zosyn, renally dosed, for aspiration PNA. CXR clear, but given pt's hx, high risk for aspiration. HD stable, O2 sating well, clinically improving with suctioning  - change in mental status concerning for stroke; CT head neg for acute process. Pt will not tolerate MRI. Out of window time period for TPA. Hba1c and lipid panel for AM. ECHO ordered. On statin, plavix, ARB already at home; currently held for dysphagia.  - unlikely cardiac etiology. although initial trop elevated at 131, repeat in 1.5 hrs is downtrend to 120. Likely elevated w/ pt's hx of CKD. EKG showing NSR and non specific t wave abnormalities; no ST elevations or depressions. BNP 1191; given pt's age, not highly concerning. CXR clear. However, must r/o CHF. ECHO ordered. s/p 1 dose of lasix 20mg IV. Likely 2/2 aspiration PNA vs. pneumonitis vs CVA vs. cardiac etiology. HD stable, O2 sating well. Clinically improve with suctioning.    aspiration PNA  - on zosyn, renally dosed. CXR clear, but given pt's hx, high risk for aspiration. HD stable, O2 sating well, clinically improving with suctioning. Chest PT    change in mental status concerning for CNS process  -CT head neg. Pt will not tolerate MRI. Out of window time period for TPA. Hba1c and lipid panel for AM. ECHO ordered. On statin, plavix, ARB already at home; currently held for dysphagia.    unlikely cardiac etiology  -although initial trop elevated at 131, repeat in 1.5 hrs is downtrend to 120. Likely elevated w/ pt's hx of CKD. EKG showing NSR and non specific t wave abnormalities; no ST elevations or depressions. BNP 1191; given pt's age, not highly concerning. CXR clear. However, must r/o CHF. ECHO ordered. s/p 1 dose of lasix 20mg IV.

## 2019-09-11 NOTE — ED ADULT NURSE REASSESSMENT NOTE - NS ED NURSE REASSESS COMMENT FT1
Received report from [facility]. Pt appears comfortable in stretcher at this time, breathing even and unlabored, appears in NAD. Pt admitted, pending bed assignment at this time. MAR at bedside to talk ot granddaughter, will continue to monitor.

## 2019-09-11 NOTE — ED ADULT NURSE NOTE - NSIMPLEMENTINTERV_GEN_ALL_ED
Implemented All Universal Safety Interventions:  [place] to call system. Call bell, personal items and telephone within reach. Instruct patient to call for assistance. Room bathroom lighting operational. Non-slip footwear when patient is off stretcher. Physically safe environment: no spills, clutter or unnecessary equipment. Stretcher in lowest position, wheels locked, appropriate side rails in place.

## 2019-09-11 NOTE — H&P ADULT - NSICDXPASTMEDICALHX_GEN_ALL_CORE_FT
PAST MEDICAL HISTORY:  Alzheimer disease     Blindness     BPH (benign prostatic hyperplasia)     Chronic kidney disease (CKD)     DM (diabetes mellitus) retinopathy    HLD (hyperlipidemia)     HTN (hypertension)     PVD (peripheral vascular disease)

## 2019-09-11 NOTE — CONSULT NOTE ADULT - ASSESSMENT
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 Patient has been hemodynamically stable. He is saturating well on 4L supplemental oxygen. Concern for airway protection, c/w oropharyngeal suction. Troponins to 130 trended down to 120 in setting of ESRD and likely infection, low likelihood of CNS, EKG NSR, CXR clear lungs, CTH no acute findings, UA negative, Cr 3.8 (baseline unknown), no leukocytosis.     Recommendations:    -nasotracheal suction  -ENT evaluation for laryngoscopy  -c/w supplemental O2  -maintain upright posture    Patient is not a candidate for MICU at this time. Please reconsult should the patient's respiratory status change or he become hemodynamically unstable. 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 Patient has been hemodynamically stable. He is saturating well on 4L supplemental oxygen. Concern for airway protection, c/w oropharyngeal suction. Troponins to 130 trended down to 120 in setting of ESRD and likely infection, low likelihood of CNS, EKG NSR, CXR clear lungs, CTH no acute findings, UA negative, Cr 3.8 (baseline unknown), no leukocytosis.     Recommendations:    -nasotracheal suction  -ENT evaluation for laryngoscopy  -c/w supplemental O2  -maintain upright posture

## 2019-09-11 NOTE — ED ADULT NURSE REASSESSMENT NOTE - NS ED NURSE REASSESS COMMENT FT1
Pt at baseline, breathing even and unlabored. Pt orally suctioned for secretions. Pt 100% O2 sat on RA at this time. MED team at bedside for assessment. Pt to remain NPO. NS running at 125cc. Pt grand daughter at bedside, will continue to monitor.

## 2019-09-11 NOTE — ED PROVIDER NOTE - PHYSICAL EXAMINATION
PHYSICAL EXAM:    GENERAL: drooling and gurgling, moaning intermittently to painful stimuli   HEAD:  Normocephalic, atraumatic  EYES: blindness in both eyes   HEENT: secretions present,   NECK: Supple, No JVD  NERVOUS SYSTEM: AAOx0  CHEST/LUNG: poor inspiratory and expiratory effort   HEART: RRR, no murmurs   ABDOMEN: +BS, soft, non tender, non distended   EXTREMITIES: No peripheral edema noted   MUSCULOSKELETAL: No muscle tenderness, no joint tenderness  SKIN: warm and dry, no rash

## 2019-09-11 NOTE — H&P ADULT - ASSESSMENT
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 Suspicion for aspiration PNA given pt's hx.

## 2019-09-11 NOTE — H&P ADULT - HISTORY OF PRESENT ILLNESS
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 Hx obtained from granddaughter at bedside. Prior to symptoms, pt able to converse, move around with assistance. 2 weeks ago, pt started having "chest spasms", granddaughter describes as hiccups. 3 days ago, pt became more somnolent, not speaking, becoming more bed bound, making gurgling sounds with breathing. Endorsed that pt afebrile at home. Had home health PA come evaluate pt. Granddaughter was told that CXR was clear and that chest spasms were due to alzheimer's medications, as a result pt's alzheimer's medications were held. Family decided to bring pt in today b/c symptoms were not improving. As per granddaughter, pt did not c/o any CP, abdominal pain, dysuria, n/v/d, prior to symptom onset. 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 Hx obtained from granddaughter at bedside. Prior to symptoms, pt able to converse, move around with assistance. 2 weeks ago, pt started having "chest spasms", granddaughter describes as hiccups. 3 days ago, pt became more somnolent, not speaking, becoming more bed bound, making gurgling sounds with breathing. Endorsed that pt afebrile at home. Had home health PA come evaluate pt. Granddaughter was told that CXR was clear and that chest spasms were due to alzheimer's medications, as a result pt's alzheimer's medications were held. Family decided to bring pt in today b/c symptoms were not improving. As per granddaughter, pt did not c/o any CP, abdominal pain, dysuria, n/v/d, prior to symptom onset.    Of note, granddaughter endorses that prior to symptoms, pt had no issues with swallowing, however, pt would eat pureed soups. From medical records brought by granddaughter, pt was advised to have a dysphagia level 2: mechanically altered, thin diet. 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 Hx obtained from granddaughter at bedside. Prior to symptoms, pt able to converse, move around with assistance. 2 weeks ago, pt started having "chest spasms", granddaughter describes as hiccups. 3 days ago, pt started to complain of throat pain and became more somnolent, not speaking, becoming more bed bound, making gurgling sounds with breathing. Endorsed that pt afebrile at home. Had home health PA come evaluate pt. Granddaughter was told that CXR was clear and that chest spasms were due to alzheimer's medications, as a result pt's alzheimer's medications were held. Family decided to bring pt in today b/c symptoms were not improving. As per granddaughter, pt did not c/o any CP, abdominal pain, dysuria, n/v/d, prior to symptom onset.    Of note, granddaughter endorses that prior to symptoms, pt had no issues with swallowing, however, pt would eat pureed soups. From medical records brought by granddaughter, pt was advised to have a dysphagia level 2: mechanically altered, thin diet.

## 2019-09-11 NOTE — ED ADULT TRIAGE NOTE - CHIEF COMPLAINT QUOTE
Pt arrives , Non verbal, excessive drooling , hiccupping as per dawnater" for past couple of days he as been drooling and very weak....[facility] came to house did xrays they said the chest xray was ok...they took him off Alzhiemers meds ....but he usually can talk past 3 days stopped talking...and his breathing seems labored." +Blind Pt arrives , Non verbal, excessive drooling , hiccupping, difficulty clearing secreations, lethargic. As per dawnater" for past couple of days he as been drooling and very weak....[facility] came to house did xrays they said the chest xray was ok...they took him off Alzhiemers meds ....but he usually can talk past 3 days stopped talking...and his breathing seems labored." +Blind

## 2019-09-12 LAB
ANION GAP SERPL CALC-SCNC: 17 MMO/L — HIGH (ref 7–14)
BASOPHILS # BLD AUTO: 0 K/UL — SIGNIFICANT CHANGE UP (ref 0–0.2)
BASOPHILS NFR BLD AUTO: 0 % — SIGNIFICANT CHANGE UP (ref 0–2)
BUN SERPL-MCNC: 74 MG/DL — HIGH (ref 7–23)
CALCIUM SERPL-MCNC: 8 MG/DL — LOW (ref 8.4–10.5)
CHLORIDE SERPL-SCNC: 105 MMOL/L — SIGNIFICANT CHANGE UP (ref 98–107)
CHOLEST SERPL-MCNC: 76 MG/DL — LOW (ref 120–199)
CO2 SERPL-SCNC: 15 MMOL/L — LOW (ref 22–31)
CREAT SERPL-MCNC: 4.03 MG/DL — HIGH (ref 0.5–1.3)
EOSINOPHIL # BLD AUTO: 0 K/UL — SIGNIFICANT CHANGE UP (ref 0–0.5)
EOSINOPHIL NFR BLD AUTO: 0 % — SIGNIFICANT CHANGE UP (ref 0–6)
GLUCOSE BLDC GLUCOMTR-MCNC: 273 MG/DL — HIGH (ref 70–99)
GLUCOSE BLDC GLUCOMTR-MCNC: 300 MG/DL — HIGH (ref 70–99)
GLUCOSE BLDC GLUCOMTR-MCNC: 321 MG/DL — HIGH (ref 70–99)
GLUCOSE BLDC GLUCOMTR-MCNC: 340 MG/DL — HIGH (ref 70–99)
GLUCOSE SERPL-MCNC: 329 MG/DL — HIGH (ref 70–99)
HBA1C BLD-MCNC: 7.7 % — HIGH (ref 4–5.6)
HCT VFR BLD CALC: 23.4 % — LOW (ref 39–50)
HDLC SERPL-MCNC: 34 MG/DL — LOW (ref 35–55)
HGB BLD-MCNC: 7.4 G/DL — LOW (ref 13–17)
IMM GRANULOCYTES NFR BLD AUTO: 0.6 % — SIGNIFICANT CHANGE UP (ref 0–1.5)
LIPID PNL WITH DIRECT LDL SERPL: 29 MG/DL — SIGNIFICANT CHANGE UP
LYMPHOCYTES # BLD AUTO: 0.14 K/UL — LOW (ref 1–3.3)
LYMPHOCYTES # BLD AUTO: 3 % — LOW (ref 13–44)
MAGNESIUM SERPL-MCNC: 2.2 MG/DL — SIGNIFICANT CHANGE UP (ref 1.6–2.6)
MCHC RBC-ENTMCNC: 27.2 PG — SIGNIFICANT CHANGE UP (ref 27–34)
MCHC RBC-ENTMCNC: 31.6 % — LOW (ref 32–36)
MCV RBC AUTO: 86 FL — SIGNIFICANT CHANGE UP (ref 80–100)
MONOCYTES # BLD AUTO: 0.05 K/UL — SIGNIFICANT CHANGE UP (ref 0–0.9)
MONOCYTES NFR BLD AUTO: 1.1 % — LOW (ref 2–14)
NEUTROPHILS # BLD AUTO: 4.5 K/UL — SIGNIFICANT CHANGE UP (ref 1.8–7.4)
NEUTROPHILS NFR BLD AUTO: 95.3 % — HIGH (ref 43–77)
NRBC # FLD: 0.05 K/UL — SIGNIFICANT CHANGE UP (ref 0–0)
NRBC FLD-RTO: 1.1 — SIGNIFICANT CHANGE UP
PHOSPHATE SERPL-MCNC: 5.1 MG/DL — HIGH (ref 2.5–4.5)
PLATELET # BLD AUTO: 272 K/UL — SIGNIFICANT CHANGE UP (ref 150–400)
PMV BLD: 10.1 FL — SIGNIFICANT CHANGE UP (ref 7–13)
POTASSIUM SERPL-MCNC: 4.3 MMOL/L — SIGNIFICANT CHANGE UP (ref 3.5–5.3)
POTASSIUM SERPL-SCNC: 4.3 MMOL/L — SIGNIFICANT CHANGE UP (ref 3.5–5.3)
RBC # BLD: 2.72 M/UL — LOW (ref 4.2–5.8)
RBC # FLD: 13.6 % — SIGNIFICANT CHANGE UP (ref 10.3–14.5)
SODIUM SERPL-SCNC: 137 MMOL/L — SIGNIFICANT CHANGE UP (ref 135–145)
TRIGL SERPL-MCNC: 68 MG/DL — SIGNIFICANT CHANGE UP (ref 10–149)
TSH SERPL-MCNC: 0.23 UIU/ML — LOW (ref 0.27–4.2)
WBC # BLD: 4.72 K/UL — SIGNIFICANT CHANGE UP (ref 3.8–10.5)
WBC # FLD AUTO: 4.72 K/UL — SIGNIFICANT CHANGE UP (ref 3.8–10.5)

## 2019-09-12 PROCEDURE — 99233 SBSQ HOSP IP/OBS HIGH 50: CPT | Mod: GC

## 2019-09-12 RX ORDER — METOCLOPRAMIDE HCL 10 MG
5 TABLET ORAL EVERY 8 HOURS
Refills: 0 | Status: COMPLETED | OUTPATIENT
Start: 2019-09-12 | End: 2019-09-13

## 2019-09-12 RX ORDER — SODIUM CHLORIDE 9 MG/ML
1000 INJECTION, SOLUTION INTRAVENOUS
Refills: 0 | Status: DISCONTINUED | OUTPATIENT
Start: 2019-09-12 | End: 2019-09-13

## 2019-09-12 RX ORDER — METOCLOPRAMIDE HCL 10 MG
10 TABLET ORAL EVERY 8 HOURS
Refills: 0 | Status: DISCONTINUED | OUTPATIENT
Start: 2019-09-12 | End: 2019-09-12

## 2019-09-12 RX ADMIN — PANTOPRAZOLE SODIUM 40 MILLIGRAM(S): 20 TABLET, DELAYED RELEASE ORAL at 12:42

## 2019-09-12 RX ADMIN — Medication 4: at 12:42

## 2019-09-12 RX ADMIN — Medication 101.6 MILLIGRAM(S): at 14:15

## 2019-09-12 RX ADMIN — Medication 101.6 MILLIGRAM(S): at 05:03

## 2019-09-12 RX ADMIN — Medication 3 MILLILITER(S): at 03:18

## 2019-09-12 RX ADMIN — Medication 3: at 18:07

## 2019-09-12 RX ADMIN — SODIUM CHLORIDE 75 MILLILITER(S): 9 INJECTION, SOLUTION INTRAVENOUS at 11:41

## 2019-09-12 RX ADMIN — HEPARIN SODIUM 5000 UNIT(S): 5000 INJECTION INTRAVENOUS; SUBCUTANEOUS at 21:38

## 2019-09-12 RX ADMIN — Medication 4: at 05:39

## 2019-09-12 RX ADMIN — Medication 3 MILLILITER(S): at 22:42

## 2019-09-12 RX ADMIN — HEPARIN SODIUM 5000 UNIT(S): 5000 INJECTION INTRAVENOUS; SUBCUTANEOUS at 13:45

## 2019-09-12 RX ADMIN — Medication 3 MILLILITER(S): at 09:51

## 2019-09-12 RX ADMIN — PIPERACILLIN AND TAZOBACTAM 25 GRAM(S): 4; .5 INJECTION, POWDER, LYOPHILIZED, FOR SOLUTION INTRAVENOUS at 17:18

## 2019-09-12 RX ADMIN — HEPARIN SODIUM 5000 UNIT(S): 5000 INJECTION INTRAVENOUS; SUBCUTANEOUS at 05:03

## 2019-09-12 RX ADMIN — PIPERACILLIN AND TAZOBACTAM 25 GRAM(S): 4; .5 INJECTION, POWDER, LYOPHILIZED, FOR SOLUTION INTRAVENOUS at 05:33

## 2019-09-12 RX ADMIN — Medication 5 MILLIGRAM(S): at 21:39

## 2019-09-12 RX ADMIN — Medication 3 MILLILITER(S): at 15:29

## 2019-09-12 RX ADMIN — Medication 5 MILLIGRAM(S): at 13:44

## 2019-09-12 NOTE — CHART NOTE - NSCHARTNOTEFT_GEN_A_CORE
Called to bedside to speak with patient's family and provide updates on patients condition.     Family updated on patients current medical problems, including treatment for possible aspiration pneumonia as well as laryngeal edema. Patient's wife Orly Tao, and patient's granddaughter Pauline Faith present. Ms. Tao and Ms. Faith were requesting to be made the healthcare proxies for the patient. Patient's spouse Ms. Tao informed that she is already the primary decision maker for the patient as he cannot currently make decisions for himself and that she is his spouse. Ms. Tao would also like to designate Ms. Faith as a secondary decision maker in the event that we cannot reach her. Both family members affirm that they are aware of the patient's wishes and are in agreement with each other on how his care should be managed.     For now, both Ms. Tao and Ms. Faith would like all possible medical interventions for Mr. Tyrone Tao, including intubation, pressors, and chest compressions if deemed necessary.     Most recent contact information for Ms. Tao and Ms. Faith are below.    Orly Tao (wife): [phone]  Pauline Faith ([facility]): [phone]    Primary team to be informed. No forms completed at this time.    Cornelio Doll, PGY-1  [facility] [employer], -1,2,3  Pager [phone] Called to bedside to speak with patient's family and provide updates on patients condition.     Family updated on patients current medical problems, including treatment for possible aspiration pneumonia as well as laryngeal edema. Patient's wife Orly Tao, patient's granddaughter Pauline Faith, and patient's grandson Maximus (Jaya Vo as well as RN Gale Mcnair present. Ms. Tao and Ms. Faith were requesting to be made the healthcare proxies for the patient. Patient's spouse Ms. Tao informed that she is already the primary decision maker for the patient as he cannot currently make decisions for himself and that she is his spouse. Ms. Tao would also like to designate Ms. Faith as a secondary decision maker in the event that we cannot reach her. Both family members affirm that they are aware of the patient's wishes and are in agreement with each other on how his care should be managed.     For now, both Ms. Tao and Ms. Faith would like all possible medical interventions for Mr. Tyrone Tao, including intubation, pressors, and chest compressions if deemed necessary.     Most up to date contact information for Ms. Tao and Ms. Faith are below.    Orly Tao (wife): [phone]  Pauline Faith ([facility]): [phone]    Primary team to be informed. No forms completed at this time.    Cornelio Doll, PGY-1  [facility], HS-1,2,3  Pager [phone] Called to bedside to speak with patient's family and provide updates on patients condition.     Family updated on patients current medical problems, including treatment for possible aspiration pneumonia as well as laryngeal edema. Patient's wife Orly Tao, patient's granddaughter Pauline Faith, patient's grandson Maximus (Fernando) Gilda and RN Gale Mcnair present. Ms. Tao and Ms. Faith were requesting to be made the healthcare proxies for the patient. Patient's spouse Ms. Tao informed that she is already the primary decision maker for the patient as he cannot currently make decisions for himself and that she is his spouse. Ms. Tao would also like to designate Ms. Faith as a secondary decision maker in the event that we cannot reach her. Both family members affirm that they are aware of the patient's wishes and are in agreement with each other on how his care should be managed.     For now, both Ms. Tao and Ms. Faith would like all possible medical interventions for Mr. Tyrone Tao, including intubation, pressors, and chest compressions if deemed necessary.     Most up to date contact information for Ms. Tao and Ms. Faith are below.    Orly Tao (wife): [phone]  Pauline Faith ([facility]): [phone]    Primary team to be informed. No forms completed at this time.    Cornelio Doll, PGY-1  [facility], -1,2,3  Pager [phone]

## 2019-09-12 NOTE — DIETITIAN INITIAL EVALUATION ADULT. - PERTINENT MEDS FT
MEDICATIONS  (STANDING):  ALBUTerol/ipratropium for Nebulization 3 milliLiter(s) Nebulizer every 6 hours  dexamethasone  IVPB 8 milliGRAM(s) IV Intermittent every 8 hours  dextrose 5%. 1000 milliLiter(s) (50 mL/Hr) IV Continuous <Continuous>  dextrose 50% Injectable 12.5 Gram(s) IV Push once  dextrose 50% Injectable 25 Gram(s) IV Push once  dextrose 50% Injectable 25 Gram(s) IV Push once  heparin  Injectable 5000 Unit(s) SubCutaneous every 8 hours  insulin lispro (HumaLOG) corrective regimen sliding scale   SubCutaneous every 6 hours  lactated ringers. 1000 milliLiter(s) (75 mL/Hr) IV Continuous <Continuous>  metoclopramide Injectable 5 milliGRAM(s) IV Push every 8 hours  pantoprazole  Injectable 40 milliGRAM(s) IV Push daily  piperacillin/tazobactam IVPB.. 3.375 Gram(s) IV Intermittent every 12 hours    MEDICATIONS  (PRN):  dextrose 40% Gel 15 Gram(s) Oral once PRN Blood Glucose LESS THAN 70 milliGRAM(s)/deciliter  glucagon  Injectable 1 milliGRAM(s) IntraMuscular once PRN Glucose LESS THAN 70 milligrams/deciliter  ondansetron Injectable 4 milliGRAM(s) IV Push every 6 hours PRN Nausea and/or Vomiting

## 2019-09-12 NOTE — PROGRESS NOTE ADULT - PROBLEM SELECTOR PLAN 6
protein/calorie malnutrition.  - speech and swallow evaluated pt; NPO for now protein/calorie malnutrition.  - speech and swallow to re-evaluate pt tomorrow; NPO for now

## 2019-09-12 NOTE — PROGRESS NOTE ADULT - PROBLEM SELECTOR PLAN 2
hx of dysphagia, on pureed diet at home. drooling on presentation, seems unable to swallow secretions.   - palliative consult? for peg?  - concern for inability to protect airway; MICU evaluated pt, not MICU candidate; ENT scoped, found laryngeal edema, pooling secretions at glottic opening  - speech and swallow evaluated pt; NPO for now, aspiration precautions  - zofran PRN for nausea hx of dysphagia, on pureed diet at home. drooling on presentation, seems unable to swallow secretions. Improving with better mental status today  - concern for inability to protect airway; MICU evaluated pt, not MICU candidate; ENT scoped, found laryngeal edema, pooling secretions at glottic opening  - as per ENT, decadron and PPI  - speech and swallow to re evaluate pt tomorrow as mental status improving; NPO for now, aspiration precautions  - zofran PRN for nausea    #Hiccups  persistent hiccups  - trial of reglan 5mg q8 x 3 doses, renally dosed

## 2019-09-12 NOTE — PROGRESS NOTE ADULT - ATTENDING COMMENTS
Patient seen and examined. Case discussed with house staff. Agree with resident note above as edited.    79M hx of alzheimer's dementia, HTN, PVD, CKD stage III, T2DM, blindness p/w 3 days of metabolic encephalopathy and respiratory distress and increased secretions, likely aspiration PNA. Mental status somewhat improved today.     #Metabolic Encephalopathy   - Improved from yesterday, patient mumbling today and following commands intermittently   - Per granddaughter at baseline patient is talkative, able to recognize family members and can walk to bathroom  - Etiology: CVA event vs. aspiration event   - Possible CVA event: CT head unremarkable, but at this time patient will not be able to tolerate MRI due to copious secretions and tenuous respiratory status. Unable to get CTA Head/Nneck due to elevated Cr.   - Patient afebrile, UA negative, CXR negative. Will c/w Zosyn for now given high likelihood of aspiration.     #Dysphagia/Aspiration   - Patient w/ pooling secretions, although appears to be protecting airway at this time.   - NPO, aspiration precautions.   - ENT recs appreciated, Decadron 8mg q8h x3 doses for possible edema   - S/S eval deferred, will re-attempt tomorrow if mental status continues to improve  - C/w Zosyn given high concern for aspiration event   - Hiccuping - will give protonix for possible GERD. Avoiding sedating medications. Will also trial Reglan     #CKD   - Baseline Cr unknown  - Cr uptrending, will start gentle IVF while patient NPO     #Low TSH   - Will check T3/free T4  - Likely euthyroid sick syndrome, will need repeat TFTs in 6 weeks.     #Anemia   - Downtrending H/H, will check iron studies, B12, folate  - Monitor CBC    #GOC - patient is full code at this time per discussion with granddaughter.

## 2019-09-12 NOTE — PROGRESS NOTE ADULT - PROBLEM SELECTOR PLAN 4
On Humulin 70/30 at home with 25 units in AM and 15 units in evening. HbA1c 7.7. -321  - pt NPO, FS and ISS q 6

## 2019-09-12 NOTE — PROGRESS NOTE ADULT - PROBLEM SELECTOR PLAN 9
Discussed with granddaughter about code status. Currently full code. Encouraged discussion of pt's long term care plan with grandmother (pt's wife/HCP).  - pt's wife endorsed that pt would want to be DNR/DNI, but family wants pt to be full code. We had a discussion of the consequences of being intubated as well as chest compressions, including being unable to come off the machine or death. Family understood and wants pt full code

## 2019-09-12 NOTE — CHART NOTE - NSCHARTNOTEFT_GEN_A_CORE
NUTRITION SERVICES     Upon Nutritional Assessment by the Registered Dietitian your patient was determined to meet criteria/ has evidence of the following diagnosis/diagnoses:  [ ] Mild Protein Calorie Malnutrition   [   X  ] Moderate Protein Calorie Malnutrition   [ ] Severe Protein Calorie Malnutrition   [ ] Unspecified Protein Calorie Malnutrition   [ ] Underweight / BMI <19  [ ] Morbid Obesity / BMI >40    Findings as based on:  •  Comprehensive nutritional assessment and consultation    Please refer to Initial Dietitian Evaluation via documents section of [facility] EMR for further recommendations.    Barbara Garrett RDN, CDN   pager: [phone]

## 2019-09-12 NOTE — DIETITIAN INITIAL EVALUATION ADULT. - OTHER INFO
Pt. w Hx of Alzheimer's dementia and with altered mental status x 3 days PTA.  Currently, Pt. confused, disoriented & unable to provide any nutrition Hx.  Spoke with RN. Per chart, Pt. was consuming pureed foods at home.  Pt. s/p swallow evaluation 9/11/19... oral means of Nutrition/ Hydration/ Medication contraindicated at this time.  Discussed with physicians.  No stated or noted nausea/vomiting/diarrhea/constipation.  NKFA.

## 2019-09-12 NOTE — DIETITIAN INITIAL EVALUATION ADULT. - ADD RECOMMEND
1)Establish long term nutritional goals of care  2)If Pt.'s mental status improves, obtain repeat swallow evaluation, as feasible.

## 2019-09-12 NOTE — PROGRESS NOTE ADULT - PROBLEM SELECTOR PLAN 3
Cr 3.8. Unknown baseline. Pt has hx of CKD stage 3.  - will reach out to outpt PCP  - bladder scan to see if pt is retaining  - monitor Cr

## 2019-09-12 NOTE — DIETITIAN INITIAL EVALUATION ADULT. - PHYSICAL APPEARANCE
debilitated/underweight/other (specify) Moderate subcutaneous fat loss of orbital, buccal &, upper arm regions.  Moderate muscle loss of temple, clavicle, acromion and patellar regions as evidenced by some protrusion of bone.    No noted edema or pressure injuries.

## 2019-09-12 NOTE — PROGRESS NOTE ADULT - PROBLEM SELECTOR PLAN 1
Likely 2/2 aspiration PNA vs. pneumonitis vs CVA vs. cardiac etiology. HD stable, O2 sating well. Clinically improve with suctioning.    aspiration PNA  - on zosyn, renally dosed. CXR clear, but given pt's hx, high risk for aspiration. HD stable, O2 sating well, clinically improving with suctioning. Chest PT    change in mental status concerning for CNS process  -CT head neg. Pt will not tolerate MRI. Out of window time period for TPA. Hba1c and lipid panel for AM. ECHO ordered. On statin, plavix, ARB already at home; currently held for dysphagia.  -as per daughter, pt given klonopin 0.5mg at home 2 days ago and returned to baseline (conversing, more mobile); EEG for today    unlikely cardiac etiology  -although initial trop elevated at 131, repeat in 1.5 hrs is downtrend to 120. Likely elevated w/ pt's hx of CKD. EKG showing NSR and non specific t wave abnormalities; no ST elevations or depressions. BNP 1191; given pt's age, not highly concerning. CXR clear. However, must r/o CHF. ECHO ordered. s/p 1 dose of lasix 20mg IV. Likely 2/2 aspiration PNA vs. pneumonitis vs CVA vs. cardiac etiology. HD stable, O2 sating well. Clinically improve with suctioning.    aspiration PNA  - on zosyn, renally dosed. CXR clear, but given pt's hx, high risk for aspiration. HD stable, O2 sating well, clinically improving with suctioning. Chest PT    change in mental status concerning for CNS process  -CT head neg. Pt will not tolerate MRI. Out of window time period for TPA. Hba1c 7.7, lipid panel unremarkable. ECHO ordered. On statin, plavix, ARB already at home; currently held for dysphagia.    unlikely cardiac etiology  -although initial trop elevated at 131, repeat in 1.5 hrs is downtrend to 120. Likely elevated w/ pt's hx of CKD. EKG showing NSR and non specific t wave abnormalities; no ST elevations or depressions. BNP 1191; given pt's age, not highly concerning. CXR clear. However, must r/o CHF. ECHO ordered. s/p 1 dose of lasix 20mg IV.

## 2019-09-12 NOTE — DIETITIAN INITIAL EVALUATION ADULT. - PERTINENT LABORATORY DATA
09-12 Na137 mmol/L Glu 329 mg/dL<H> K+ 4.3 mmol/L Cr  4.03 mg/dL<H> BUN 74 mg/dL<H> 09-12 Phos 5.1 mg/dL<H> 09-11 Alb 3.0 g/dL<L> 09-12 [id number] 7.7 %<H> 09-12 Chol 76 mg/dL<L> LDL 29 mg/dL HDL 34 mg/dL<L> Trig 68 mg/dL  POCT Blood Glucose.: 321 mg/dL (12 Sep 2019 05:37)  POCT Blood Glucose.: 227 mg/dL (11 Sep 2019 23:28)  POCT Blood Glucose.: 189 mg/dL (11 Sep 2019 17:56)  POCT Blood Glucose.: 192 mg/dL (11 Sep 2019 14:14)  POCT Blood Glucose.: 159 mg/dL (11 Sep 2019 11:43)

## 2019-09-12 NOTE — PROGRESS NOTE ADULT - SUBJECTIVE AND OBJECTIVE BOX
Tawanda Mcdaniels, PGY-1  Internal Medicine  Pager [phone]    OVERNIGHT / SUBJECTIVE EVENTS:  -no acute events overnight  -Pt was seen and examined at bedside. Pt had no complaints. Denied f/c/n/v, CP, SOB, changes in vision, abdominal pain, dysuria, constipation, and diarrhea.    MEDICATIONS  (STANDING):  ALBUTerol/ipratropium for Nebulization 3 milliLiter(s) Nebulizer every 6 hours  dexamethasone  IVPB 8 milliGRAM(s) IV Intermittent every 8 hours  dextrose 5%. 1000 milliLiter(s) (50 mL/Hr) IV Continuous <Continuous>  dextrose 50% Injectable 12.5 Gram(s) IV Push once  dextrose 50% Injectable 25 Gram(s) IV Push once  dextrose 50% Injectable 25 Gram(s) IV Push once  heparin  Injectable 5000 Unit(s) SubCutaneous every 8 hours  insulin lispro (HumaLOG) corrective regimen sliding scale   SubCutaneous every 6 hours  pantoprazole  Injectable 40 milliGRAM(s) IV Push daily  piperacillin/tazobactam IVPB.. 3.375 Gram(s) IV Intermittent every 12 hours    MEDICATIONS  (PRN):  dextrose 40% Gel 15 Gram(s) Oral once PRN Blood Glucose LESS THAN 70 milliGRAM(s)/deciliter  glucagon  Injectable 1 milliGRAM(s) IntraMuscular once PRN Glucose LESS THAN 70 milligrams/deciliter  ondansetron Injectable 4 milliGRAM(s) IV Push every 6 hours PRN Nausea and/or Vomiting      Allergies    latex (Unknown)  No Known Drug Allergies    Intolerances        OBJECTIVE:  Vital Signs Last 24 Hrs  T(C): 36.7 (12 Sep 2019 05:00), Max: 36.9 (12 Sep 2019 01:00)  T(F): 98.1 (12 Sep 2019 05:00), Max: 98.4 (12 Sep 2019 01:00)  HR: 92 (12 Sep 2019 05:00) (80 - 92)  BP: 124/62 (12 Sep 2019 05:00) (110/90 - 178/67)  BP(mean): --  RR: 18 (12 Sep 2019 05:00) (16 - 19)  SpO2: 100% (12 Sep 2019 05:00) (97% - 100%)       @ 07:01  -   @ 07:00  --------------------------------------------------------  IN: 200 mL / OUT: 0 mL / NET: 200 mL      CAPILLARY BLOOD GLUCOSE      POCT Blood Glucose.: 321 mg/dL (12 Sep 2019 05:37)  POCT Blood Glucose.: 227 mg/dL (11 Sep 2019 23:28)  POCT Blood Glucose.: 189 mg/dL (11 Sep 2019 17:56)  POCT Blood Glucose.: 192 mg/dL (11 Sep 2019 14:14)  POCT Blood Glucose.: 159 mg/dL (11 Sep 2019 11:43)  POCT Blood Glucose.: 142 mg/dL (11 Sep 2019 07:47)    I&O's Summary    11 Sep 2019 07:01  -  12 Sep 2019 07:00  --------------------------------------------------------  IN: 200 mL / OUT: 0 mL / NET: 200 mL        PHYSICAL EXAM:  GENERAL: NAD, well-developed  HEAD:  Atraumatic, Normocephalic  EYES: EOMI, PERRLA, conjunctiva and sclera clear  NECK: Supple, No JVD  CHEST/LUNG: Clear to auscultation bilaterally; No wheeze  HEART: Regular rate and rhythm; No murmurs, rubs, or gallops  ABDOMEN: Soft, Nontender, Nondistended; Bowel sounds present  EXTREMITIES:  2+ Peripheral Pulses, No clubbing, cyanosis, or edema  PSYCH: AAOx3  SKIN: No rashes or lesions    LABS:                        9.3    9.26  )-----------( 328      ( 11 Sep 2019 01:00 )             29.2     09-11    132<L>  |  98  |  70<H>  ----------------------------<  252<H>  4.4   |  19<L>  |  3.80<H>    Ca    8.5      11 Sep 2019 01:00    TPro  6.1  /  Alb  3.0<L>  /  TBili  < 0.2<L>  /  DBili  x   /  AST  11  /  ALT  10  /  AlkPhos  78  09-11    LIVER FUNCTIONS - ( 11 Sep 2019 01:00 )  Alb: 3.0 g/dL / Pro: 6.1 g/dL / ALK PHOS: 78 u/L / ALT: 10 u/L / AST: 11 u/L / GGT: x           PT/INR - ( 11 Sep 2019 01:00 )   PT: 11.7 SEC;   INR: 1.05          PTT - ( 11 Sep 2019 01:00 )  PTT:23.5 SEC  Urinalysis Basic - ( 11 Sep 2019 01:45 )    Color: YELLOW / Appearance: CLEAR / S.015 / pH: 6.0  Gluc: 70 / Ketone: NEGATIVE  / Bili: NEGATIVE / Urobili: NORMAL   Blood: NEGATIVE / Protein: 200 / Nitrite: NEGATIVE   Leuk Esterase: NEGATIVE / RBC: 0-2 / WBC 3-5   Sq Epi: OCC / Non Sq Epi: x / Bacteria: NEGATIVE        Venous Blood Gas:   @ 01:00  7.33/40/28/20/41.8  VBG Lactate: 1.6    PT/INR - ( 11 Sep 2019 01:00 )   PT: 11.7 SEC;   INR: 1.05          PTT - ( 11 Sep 2019 01:00 )  PTT:23.5 SEC      Urinalysis Basic - ( 11 Sep 2019 01:45 )    Color: YELLOW / Appearance: CLEAR / S.015 / pH: 6.0  Gluc: 70 / Ketone: NEGATIVE  / Bili: NEGATIVE / Urobili: NORMAL   Blood: NEGATIVE / Protein: 200 / Nitrite: NEGATIVE   Leuk Esterase: NEGATIVE / RBC: 0-2 / WBC 3-5   Sq Epi: OCC / Non Sq Epi: x / Bacteria: NEGATIVE        MICROBIOLOGY:     no new micro    RADIOLOGY & ADDITIONAL TESTS:  no new imaging    CONSULTS:  consult notes reviewed and discussed with respective teams Tawanda Mcdaniels, PGY-1  Internal Medicine  Pager [phone]    OVERNIGHT / SUBJECTIVE EVENTS:  -no acute events overnight  -Pt was seen and examined at bedside. Pt more verbal this AM; able to say "good morning". AAOx2 this AM. Pt holding neck with hand. When asked about pain, points to center of chest. When asked about main complaint, able to say hiccups.     MEDICATIONS  (STANDING):  ALBUTerol/ipratropium for Nebulization 3 milliLiter(s) Nebulizer every 6 hours  dexamethasone  IVPB 8 milliGRAM(s) IV Intermittent every 8 hours  dextrose 5%. 1000 milliLiter(s) (50 mL/Hr) IV Continuous <Continuous>  dextrose 50% Injectable 12.5 Gram(s) IV Push once  dextrose 50% Injectable 25 Gram(s) IV Push once  dextrose 50% Injectable 25 Gram(s) IV Push once  heparin  Injectable 5000 Unit(s) SubCutaneous every 8 hours  insulin lispro (HumaLOG) corrective regimen sliding scale   SubCutaneous every 6 hours  pantoprazole  Injectable 40 milliGRAM(s) IV Push daily  piperacillin/tazobactam IVPB.. 3.375 Gram(s) IV Intermittent every 12 hours    MEDICATIONS  (PRN):  dextrose 40% Gel 15 Gram(s) Oral once PRN Blood Glucose LESS THAN 70 milliGRAM(s)/deciliter  glucagon  Injectable 1 milliGRAM(s) IntraMuscular once PRN Glucose LESS THAN 70 milligrams/deciliter  ondansetron Injectable 4 milliGRAM(s) IV Push every 6 hours PRN Nausea and/or Vomiting      Allergies    latex (Unknown)  No Known Drug Allergies    Intolerances        OBJECTIVE:  Vital Signs Last 24 Hrs  T(C): 36.7 (12 Sep 2019 05:00), Max: 36.9 (12 Sep 2019 01:00)  T(F): 98.1 (12 Sep 2019 05:00), Max: 98.4 (12 Sep 2019 01:00)  HR: 92 (12 Sep 2019 05:00) (80 - 92)  BP: 124/62 (12 Sep 2019 05:00) (110/90 - 178/67)  BP(mean): --  RR: 18 (12 Sep 2019 05:00) (16 - 19)  SpO2: 100% (12 Sep 2019 05:00) (97% - 100%)      - @ 07:01  -  09-12 @ 07:00  --------------------------------------------------------  IN: 200 mL / OUT: 0 mL / NET: 200 mL      CAPILLARY BLOOD GLUCOSE      POCT Blood Glucose.: 321 mg/dL (12 Sep 2019 05:37)  POCT Blood Glucose.: 227 mg/dL (11 Sep 2019 23:28)  POCT Blood Glucose.: 189 mg/dL (11 Sep 2019 17:56)  POCT Blood Glucose.: 192 mg/dL (11 Sep 2019 14:14)  POCT Blood Glucose.: 159 mg/dL (11 Sep 2019 11:43)  POCT Blood Glucose.: 142 mg/dL (11 Sep 2019 07:47)    I&O's Summary    11 Sep 2019 07:01  -  12 Sep 2019 07:00  --------------------------------------------------------  IN: 200 mL / OUT: 0 mL / NET: 200 mL        PHYSICAL EXAM:  GENERAL: NAD, well-developed  HEAD:  Atraumatic, Normocephalic  NECK: Supple, No JVD  CHEST/LUNG: Clear to auscultation bilaterally; No wheeze  HEART: Regular rate and rhythm; No murmurs, rubs, or gallops  ABDOMEN: Soft, Nontender, Nondistended; Bowel sounds present  EXTREMITIES:  No clubbing, cyanosis, or edema  NEURO: AAOx2  SKIN: No rashes or lesions    LABS:                        9.3    9.26  )-----------( 328      ( 11 Sep 2019 01:00 )             29.2     09-11    132<L>  |  98  |  70<H>  ----------------------------<  252<H>  4.4   |  19<L>  |  3.80<H>    Ca    8.5      11 Sep 2019 01:00    TPro  6.1  /  Alb  3.0<L>  /  TBili  < 0.2<L>  /  DBili  x   /  AST  11  /  ALT  10  /  AlkPhos  78  09-11    LIVER FUNCTIONS - ( 11 Sep 2019 01:00 )  Alb: 3.0 g/dL / Pro: 6.1 g/dL / ALK PHOS: 78 u/L / ALT: 10 u/L / AST: 11 u/L / GGT: x           PT/INR - ( 11 Sep 2019 01:00 )   PT: 11.7 SEC;   INR: 1.05          PTT - ( 11 Sep 2019 01:00 )  PTT:23.5 SEC  Urinalysis Basic - ( 11 Sep 2019 01:45 )    Color: YELLOW / Appearance: CLEAR / S.015 / pH: 6.0  Gluc: 70 / Ketone: NEGATIVE  / Bili: NEGATIVE / Urobili: NORMAL   Blood: NEGATIVE / Protein: 200 / Nitrite: NEGATIVE   Leuk Esterase: NEGATIVE / RBC: 0-2 / WBC 3-5   Sq Epi: OCC / Non Sq Epi: x / Bacteria: NEGATIVE        Venous Blood Gas:   @ 01:00  7.33/40/28/20/41.8  VBG Lactate: 1.6    PT/INR - ( 11 Sep 2019 01:00 )   PT: 11.7 SEC;   INR: 1.05          PTT - ( 11 Sep 2019 01:00 )  PTT:23.5 SEC      Urinalysis Basic - ( 11 Sep 2019 01:45 )    Color: YELLOW / Appearance: CLEAR / S.015 / pH: 6.0  Gluc: 70 / Ketone: NEGATIVE  / Bili: NEGATIVE / Urobili: NORMAL   Blood: NEGATIVE / Protein: 200 / Nitrite: NEGATIVE   Leuk Esterase: NEGATIVE / RBC: 0-2 / WBC 3-5   Sq Epi: OCC / Non Sq Epi: x / Bacteria: NEGATIVE        MICROBIOLOGY:     no new micro    RADIOLOGY & ADDITIONAL TESTS:  no new imaging    CONSULTS:  consult notes reviewed and discussed with respective teams

## 2019-09-12 NOTE — PROGRESS NOTE ADULT - PROBLEM SELECTOR PLAN 5
Hgb 9.3. Unknown baseline. Likely due to anemia of chronic disease w/ hx of CKD.  - obtain iron studies Hgb 9.3. Unknown baseline. Likely due to anemia of chronic disease w/ hx of CKD. Today hgb 7.4.  - obtain iron studies, B12, folate    #low TSH  - obtain TFT

## 2019-09-13 LAB
ANION GAP SERPL CALC-SCNC: 20 MMO/L — HIGH (ref 7–14)
BASOPHILS # BLD AUTO: 0.01 K/UL — SIGNIFICANT CHANGE UP (ref 0–0.2)
BASOPHILS NFR BLD AUTO: 0.1 % — SIGNIFICANT CHANGE UP (ref 0–2)
BUN SERPL-MCNC: 66 MG/DL — HIGH (ref 7–23)
CALCIUM SERPL-MCNC: 8.2 MG/DL — LOW (ref 8.4–10.5)
CHLORIDE SERPL-SCNC: 106 MMOL/L — SIGNIFICANT CHANGE UP (ref 98–107)
CO2 SERPL-SCNC: 18 MMOL/L — LOW (ref 22–31)
CREAT SERPL-MCNC: 3.62 MG/DL — HIGH (ref 0.5–1.3)
EOSINOPHIL # BLD AUTO: 0 K/UL — SIGNIFICANT CHANGE UP (ref 0–0.5)
EOSINOPHIL NFR BLD AUTO: 0 % — SIGNIFICANT CHANGE UP (ref 0–6)
FOLATE SERPL-MCNC: 10.9 NG/ML — SIGNIFICANT CHANGE UP (ref 4.7–20)
GLUCOSE BLDC GLUCOMTR-MCNC: 239 MG/DL — HIGH (ref 70–99)
GLUCOSE BLDC GLUCOMTR-MCNC: 250 MG/DL — HIGH (ref 70–99)
GLUCOSE BLDC GLUCOMTR-MCNC: 273 MG/DL — HIGH (ref 70–99)
GLUCOSE SERPL-MCNC: 241 MG/DL — HIGH (ref 70–99)
HAPTOGLOB SERPL-MCNC: 245 MG/DL — HIGH (ref 34–200)
HCT VFR BLD CALC: 23.2 % — LOW (ref 39–50)
HGB BLD-MCNC: 7.3 G/DL — LOW (ref 13–17)
IMM GRANULOCYTES NFR BLD AUTO: 0.6 % — SIGNIFICANT CHANGE UP (ref 0–1.5)
IRON SATN MFR SERPL: 15 UG/DL — LOW (ref 45–165)
IRON SATN MFR SERPL: 174 UG/DL — SIGNIFICANT CHANGE UP (ref 155–535)
LYMPHOCYTES # BLD AUTO: 0.71 K/UL — LOW (ref 1–3.3)
LYMPHOCYTES # BLD AUTO: 8.4 % — LOW (ref 13–44)
MAGNESIUM SERPL-MCNC: 2.2 MG/DL — SIGNIFICANT CHANGE UP (ref 1.6–2.6)
MCHC RBC-ENTMCNC: 27 PG — SIGNIFICANT CHANGE UP (ref 27–34)
MCHC RBC-ENTMCNC: 31.5 % — LOW (ref 32–36)
MCV RBC AUTO: 85.9 FL — SIGNIFICANT CHANGE UP (ref 80–100)
MONOCYTES # BLD AUTO: 0.85 K/UL — SIGNIFICANT CHANGE UP (ref 0–0.9)
MONOCYTES NFR BLD AUTO: 10 % — SIGNIFICANT CHANGE UP (ref 2–14)
NEUTROPHILS # BLD AUTO: 6.87 K/UL — SIGNIFICANT CHANGE UP (ref 1.8–7.4)
NEUTROPHILS NFR BLD AUTO: 80.9 % — HIGH (ref 43–77)
NRBC # FLD: 0.03 K/UL — SIGNIFICANT CHANGE UP (ref 0–0)
PHOSPHATE SERPL-MCNC: 3.5 MG/DL — SIGNIFICANT CHANGE UP (ref 2.5–4.5)
PLATELET # BLD AUTO: 318 K/UL — SIGNIFICANT CHANGE UP (ref 150–400)
PMV BLD: 9.9 FL — SIGNIFICANT CHANGE UP (ref 7–13)
POTASSIUM SERPL-MCNC: 3.8 MMOL/L — SIGNIFICANT CHANGE UP (ref 3.5–5.3)
POTASSIUM SERPL-SCNC: 3.8 MMOL/L — SIGNIFICANT CHANGE UP (ref 3.5–5.3)
RBC # BLD: 2.7 M/UL — LOW (ref 4.2–5.8)
RBC # FLD: 13.5 % — SIGNIFICANT CHANGE UP (ref 10.3–14.5)
SODIUM SERPL-SCNC: 144 MMOL/L — SIGNIFICANT CHANGE UP (ref 135–145)
T3 SERPL-MCNC: 64.2 NG/DL — LOW (ref 80–200)
T4 FREE SERPL-MCNC: 1.16 NG/DL — SIGNIFICANT CHANGE UP (ref 0.9–1.8)
UIBC SERPL-MCNC: 159.4 UG/DL — SIGNIFICANT CHANGE UP (ref 110–370)
VIT B12 SERPL-MCNC: 1611 PG/ML — HIGH (ref 200–900)
WBC # BLD: 8.49 K/UL — SIGNIFICANT CHANGE UP (ref 3.8–10.5)
WBC # FLD AUTO: 8.49 K/UL — SIGNIFICANT CHANGE UP (ref 3.8–10.5)

## 2019-09-13 PROCEDURE — 71045 X-RAY EXAM CHEST 1 VIEW: CPT | Mod: 26

## 2019-09-13 PROCEDURE — 99233 SBSQ HOSP IP/OBS HIGH 50: CPT | Mod: GC

## 2019-09-13 PROCEDURE — 93306 TTE W/DOPPLER COMPLETE: CPT | Mod: 26

## 2019-09-13 RX ORDER — HUMAN INSULIN 100 [IU]/ML
3 INJECTION, SUSPENSION SUBCUTANEOUS EVERY 6 HOURS
Refills: 0 | Status: DISCONTINUED | OUTPATIENT
Start: 2019-09-13 | End: 2019-09-15

## 2019-09-13 RX ADMIN — PIPERACILLIN AND TAZOBACTAM 25 GRAM(S): 4; .5 INJECTION, POWDER, LYOPHILIZED, FOR SOLUTION INTRAVENOUS at 18:29

## 2019-09-13 RX ADMIN — Medication 3 MILLILITER(S): at 04:40

## 2019-09-13 RX ADMIN — PIPERACILLIN AND TAZOBACTAM 25 GRAM(S): 4; .5 INJECTION, POWDER, LYOPHILIZED, FOR SOLUTION INTRAVENOUS at 05:02

## 2019-09-13 RX ADMIN — Medication 2: at 13:09

## 2019-09-13 RX ADMIN — PANTOPRAZOLE SODIUM 40 MILLIGRAM(S): 20 TABLET, DELAYED RELEASE ORAL at 13:09

## 2019-09-13 RX ADMIN — Medication 3: at 18:29

## 2019-09-13 RX ADMIN — Medication 3 MILLILITER(S): at 23:26

## 2019-09-13 RX ADMIN — HEPARIN SODIUM 5000 UNIT(S): 5000 INJECTION INTRAVENOUS; SUBCUTANEOUS at 22:06

## 2019-09-13 RX ADMIN — HUMAN INSULIN 3 UNIT(S): 100 INJECTION, SUSPENSION SUBCUTANEOUS at 18:28

## 2019-09-13 RX ADMIN — Medication 2: at 06:32

## 2019-09-13 RX ADMIN — Medication 5 MILLIGRAM(S): at 05:02

## 2019-09-13 RX ADMIN — Medication 3 MILLILITER(S): at 09:24

## 2019-09-13 RX ADMIN — Medication 3: at 00:01

## 2019-09-13 RX ADMIN — HEPARIN SODIUM 5000 UNIT(S): 5000 INJECTION INTRAVENOUS; SUBCUTANEOUS at 13:09

## 2019-09-13 RX ADMIN — HEPARIN SODIUM 5000 UNIT(S): 5000 INJECTION INTRAVENOUS; SUBCUTANEOUS at 05:02

## 2019-09-13 RX ADMIN — Medication 3 MILLILITER(S): at 15:54

## 2019-09-13 NOTE — SWALLOW BEDSIDE ASSESSMENT ADULT - PHARYNGEAL PHASE
Delayed pharyngeal swallow/Wet vocal quality post oral intake/Decreased laryngeal elevation/Multiple swallows
Wet vocal quality post oral intake/Cough post oral intake

## 2019-09-13 NOTE — SWALLOW BEDSIDE ASSESSMENT ADULT - COMMENTS
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 Hx obtained from granddaughter at bedside. Prior to symptoms, pt able to converse, move around with assistance. 2 weeks ago, pt started having "chest spasms", granddaughter describes as hiccups. 3 days ago, pt became more somnolent, not speaking, becoming more bed bound, making gurgling sounds with breathing. Endorsed that pt afebrile at home. Had home health PA come evaluate pt. Granddaughter was told that CXR was clear and that chest spasms were due to alzheimer's medications, as a result pt's alzheimer's medications were held. Family decided to bring pt in today b/c symptoms were not improving". CXR completed on 9/11/19 reported "clear lungs". CTH completed reported "no acute intracranial bleeding".  As per chart review, there is "suspicion for aspiration PNA".     Patient was seen upright at bedside with granddaughter present. Patient was alert/awake though with minimal vocalizations/verbalizations. Patient noted with wet upper airway sounds upon arrival. Mild-Moderate clear/ thin secretions removed via Yankhauer suction prior to PO presentations.
Pt received upright in bed, awake and cooperative, on room air. Per discussion with team, pt with improved level of alertness in comparison to the previous session. Pt followed simple commands. Pt's vocal quality noted to be wet with audible/wet upper airway breath sounds.    ENT consulted 9/11, at which time scope endoscope findings revealed "pooling of copious clear secretions in the NP and all the way down to the level of the larynx. Patient's dysphagia and AMS are likely 2/2 medications vs. sepsis vs. progression of disease. He is not protecting his airway well as secretions are pooling over the glottis."    Of note, initial clinical swallow assessment completed 9/11, at which time oral nutrition/hydration/medication was contraindicated with consideration for short-term non-oral means. Team requesting swallow reassessment to determine PO candidacy given improved mental state.

## 2019-09-13 NOTE — PROGRESS NOTE ADULT - PROBLEM SELECTOR PLAN 5
Hgb 9.3. Unknown baseline. Likely due to anemia of chronic disease w/ hx of CKD. Today hgb 7.3, stable.  - obtain iron studies, B12, folate    #low TSH  - obtain TFT

## 2019-09-13 NOTE — PROGRESS NOTE ADULT - PROBLEM SELECTOR PLAN 4
On Humulin 70/30 at home with 25 units in AM and 15 units in evening. HbA1c 7.7. -340  - pt NPO, FS and ISS q 6

## 2019-09-13 NOTE — PROGRESS NOTE ADULT - PROBLEM SELECTOR PLAN 9
Discussed with granddaughter about code status. Currently full code.  - pt's wife endorsed that pt would want to be DNR/DNI, but family wants pt to be full code. We had a discussion of the consequences of being intubated as well as chest compressions, including being unable to come off the machine or death. Family understood and wants pt full code

## 2019-09-13 NOTE — SWALLOW BEDSIDE ASSESSMENT ADULT - SWALLOW EVAL: DIAGNOSIS
1. Moderate to severe oral phase dysphagia for puree consistency and honey thick liquids marked by reduced stripping of bolus from utensil, reduced bolus manipulation and reduced anterior-posterior transport. Reduced oral clearance noted marked by buccal/ lingual stasis which was manually removed via Yankhauer suction 2. Moderate-severe pharyngeal phase dysphagia for puree consistency and honey thick liquids marked by reduced and delayed laryngeal elevation upon digital palpation with multiple swallows (3x) noted per trial. Immediate evidence of wet vocal quality noted subsequent deglutition indicative of laryngeal penetration /aspiration.
Pt presents with 1. Functional oral dysphagia when given puree and honey thick liquids marked by adequate bolus retrieval and containment, timely bolus manipulation and transfer, and adequate oral clearance post swallow. 2. Severe pharyngeal dysphagia when given puree and honey thick liquids marked by suspected timely swallow onset, +laryngeal elevation to palpation, increased wet upper airway breath sounds, exacerbated wet vocal quality, and intermittent overt/wet cough response post swallow. Intermittent cough response may be indicative of reduced laryngeal sensation. 3. Oral nutrition/hydration/medication is contraindicated at this time, recommend short-term non-oral means. Consider reconsult as pt continues to clinically improve. Consider GOC discussion.

## 2019-09-13 NOTE — PROGRESS NOTE ADULT - PROBLEM SELECTOR PLAN 3
Cr 3.8 on admission. Unknown baseline. Pt has hx of CKD stage 3.  - unable to reach out to outpt PCP and nephrologist despite many attempts; will try to retrieve records from [facility]  - monitor Cr

## 2019-09-13 NOTE — PROGRESS NOTE ADULT - ATTENDING COMMENTS
79M hx of alzheimer's dementia, HTN, PVD, CKD stage III, T2DM, blindness p/w 3 days of metabolic encephalopathy and respiratory distress and increased secretions, likely aspiration PNA.    #Metabolic Encephalopathy   - Improved mental status today, not yet at baseline  - Per granddaughter at baseline patient is talkative, able to recognize family members and can walk to bathroom  - Possible CVA event: CT head unremarkable, but at this time patient will not be able to tolerate MRI due to copious secretions and tenuous respiratory status. Unable to get CTA Head/Nneck due to elevated Cr.   - Patient afebrile, UA negative, CXR negative. Will c/w Zosyn for now given high likelihood of aspiration.     #Dysphagia/Aspiration   - Patient w/ pooling secretions, although appears to be protecting airway at this time.   - NPO, aspiration precautions.   - ENT recs appreciated, s/p Decadron 8mg q8h x3 doses for possible edema   - S/S re-eval today 9/13: NPO recommended. NG placed for feeding.  Reassess next week, discuss options with family if continued unable to take PO  - C/w Zosyn given high concern for aspiration event   - Hiccuping - some relief with reglan    #CKD likely stage 4  - Baseline Cr unknown, in 2014 ~2.4  - Cr stabilizing    #Low TSH   - Likely euthyroid sick syndrome, will need repeat TFTs in 6 weeks.     #Anemia   - stable, likely chronic disease/renal    #GOC - patient is full code at this time per discussion with granddaughter.

## 2019-09-13 NOTE — SWALLOW BEDSIDE ASSESSMENT ADULT - SWALLOW EVAL: RECOMMENDED DIET
1. Oral means of Nutrition/ Hydration/ Medication contraindicated at this time 2. Consider short term non-oral means of nutrition/ hydration/ medication 3. Reconsult this service when patient's overall medical status has improved
oral nutrition/hydration/medication is contraindicated, consider short-term non-oral means and GOC discussion

## 2019-09-13 NOTE — PROGRESS NOTE ADULT - PROBLEM/PLAN-10
Subjective:     Jane Villar is a 60 y.o. male who presents today with the following:  Chief Complaint   Patient presents with    Follow-up     routine    Foot Pain     right / when he touches it     HPI: compliant with medication and lifestyle for chronic diseases listed below  . HTN: Good control. Denies angina, palpitations, dyspnea, hA , blurry vision, visual changes. Hyperlipidemia: Eating well balanced heart healthy diet. Exercises daily . Walks up to 4 miles per day. Arthritis: Morning stiffness of joints not daily. Responds well to tylenol as needed. Bump on the bottom of Rt foot. Does not bother him with walking. Prostate:Concerned PSA normal but higher last 2 OV. Would like to get level checked today. ROS: Refer to HPI:  Gen: denies fever, chills, fatigue, weight loss, weight gain  HEENT:denies blurry vision, nasal congestion, sore throat  Resp: denies dypsnea, cough, wheezing  CV: denies chest pain radiating to the jaws or arms, palpitations, lower extremity edema  Abd: denies nausea, vomiting, diarrhea, constipation  Neuro: denies numbness/tingling  Endo: denies polyuria, polydipsia, heat/cold intolerance  Heme: no lymphadenopathy    No Known Allergies      Current Outpatient Prescriptions:     amitriptyline (ELAVIL) 50 mg tablet, Take 1 Tab by mouth nightly., Disp: 90 Tab, Rfl: 0    gabapentin (NEURONTIN) 300 mg capsule, Take 1 Cap by mouth two (2) times a day. Indications: NEUROPATHIC PAIN, Disp: 180 Cap, Rfl: 1    atorvastatin (LIPITOR) 40 mg tablet, TAKE ONE TABLET BY MOUTH EVERY DAY, Disp: 90 Tab, Rfl: 3    diltiazem CD (CARDIZEM CD) 300 mg ER capsule, Take 1 Cap by mouth daily.  Indications: HYPERTENSION, Disp: 90 Cap, Rfl: 3    enalapril (VASOTEC) 20 mg tablet, TAKE ONE TABLET BY MOUTH EVERY DAY, Disp: 90 Tab, Rfl: 3    hydrochlorothiazide (HYDRODIURIL) 25 mg tablet, TAKE ONE TABLET BY MOUTH EVERY DAY, Disp: 90 Tab, Rfl: 3    hydrocortisone (ANUSOL-HC) 2.5 % rectal cream, Insert  into rectum three (3) times daily. , Disp: 30 g, Rfl: 0    diclofenac EC (VOLTAREN) 75 mg EC tablet, ONE A DAY, Disp: 90 Tab, Rfl: 1    Past Medical History   Diagnosis Date    Arthritis     Diverticulosis     Gout     Hypercholesterolemia     Hypertension        Past Surgical History   Procedure Laterality Date    Hx colonoscopy  44    Hx colonoscopy  8/2015     polyp x1       History   Smoking Status    Light Tobacco Smoker    Types: Cigarettes   Smokeless Tobacco    Never Used     Comment: occassionally       Social History     Social History    Marital status:      Spouse name: N/A    Number of children: N/A    Years of education: N/A     Social History Main Topics    Smoking status: Light Tobacco Smoker     Types: Cigarettes    Smokeless tobacco: Never Used      Comment: occassionally    Alcohol use Yes    Drug use: No    Sexual activity: Not Asked     Other Topics Concern    None     Social History Narrative       Family History   Problem Relation Age of Onset    No Known Problems Mother          Objective:     Visit Vitals    /78 (BP 1 Location: Right arm, BP Patient Position: Sitting)    Pulse 76    Temp 98.5 °F (36.9 °C) (Oral)    Resp 16    Ht 6' (1.829 m)    Wt 267 lb (121.1 kg)    SpO2 100%    BMI 36.21 kg/m2     Body mass index is 36.21 kg/(m^2). Gen: alert, oriented, no acute distress  Head: normocephalic, atraumatic  Eyes:sclera clear, conjunctiva clear  Ears: Bilateral cerumen impaction. Irrigated til clear no instrumentation by nursing. TMs and ear canals clear after irrigation  Oral: moist mucus membranes, no oral lesions, no pharyngeal exudate, no pharyngeal erythema  Neck: symmetric normal sized thyroid, no carotid bruits,   Resp: Normal work of breathing, lungs CTAB, no w/r/r  CV: S1, S2 normal.  No murmurs, rubs, or gallops. Abd:  Normal bowel sounds. Soft, not tender, not distended.     Skin: no rash             Extremities: no edema  Rt foot central below ball of foot raised annular  thickening of the skin    Results for orders placed or performed in visit on 01/13/17   CBC W/O DIFF   Result Value Ref Range    WBC 7.1 3.4 - 10.8 x10E3/uL    RBC 5.19 4.14 - 5.80 x10E6/uL    HGB 12.9 12.6 - 17.7 g/dL    HCT 40.9 37.5 - 51.0 %    MCV 79 79 - 97 fL    MCH 24.9 (L) 26.6 - 33.0 pg    MCHC 31.5 31.5 - 35.7 g/dL    RDW 13.4 12.3 - 15.4 %    PLATELET 128 502 - 134 [id number]   METABOLIC PANEL, BASIC   Result Value Ref Range    Glucose 97 65 - 99 mg/dL    BUN 8 6 - 24 mg/dL    Creatinine 1.16 0.76 - 1.27 mg/dL    GFR est non-AA 69 >59 mL/min/1.73    GFR est AA 80 >59 mL/min/1.73    BUN/Creatinine ratio 7 (L) 9 - 20    Sodium 138 134 - 144 mmol/L    Potassium 4.4 3.5 - 5.2 mmol/L    Chloride 98 96 - 106 mmol/L    CO2 24 18 - 29 mmol/L    Calcium 9.5 8.7 - 10.2 mg/dL   PSA DIAGNOSTIC (PROSTATIC SPECIFIC AG)   Result Value Ref Range    Prostate Specific Ag 2.5 0.0 - 4.0 ng/mL       Results for orders placed or performed in visit on 01/13/17   CBC W/O DIFF   Result Value Ref Range    WBC 7.1 3.4 - 10.8 x10E3/uL    RBC 5.19 4.14 - 5.80 x10E6/uL    HGB 12.9 12.6 - 17.7 g/dL    HCT 40.9 37.5 - 51.0 %    MCV 79 79 - 97 fL    MCH 24.9 (L) 26.6 - 33.0 pg    MCHC 31.5 31.5 - 35.7 g/dL    RDW 13.4 12.3 - 15.4 %    PLATELET 568 679 - 550 x10E3/uL    Narrative    Performed at:  [address]  [id number]  : Erica Zapata MD, Phone:  [phone]   METABOLIC PANEL, BASIC   Result Value Ref Range    Glucose 97 65 - 99 mg/dL    BUN 8 6 - 24 mg/dL    Creatinine 1.16 0.76 - 1.27 mg/dL    GFR est non-AA 69 >59 mL/min/1.73    GFR est AA 80 >59 mL/min/1.73    BUN/Creatinine ratio 7 (L) 9 - 20    Sodium 138 134 - 144 mmol/L    Potassium 4.4 3.5 - 5.2 mmol/L    Chloride 98 96 - 106 mmol/L    CO2 24 18 - 29 mmol/L    Calcium 9.5 8.7 - 10.2 mg/dL    Narrative    Performed at:  [address] [facility] [address], [place], [place]  [id number]  : Allyson Londono MD, Phone:  [phone]   PSA DIAGNOSTIC (PROSTATIC SPECIFIC AG)   Result Value Ref Range    Prostate Specific Ag 2.5 0.0 - 4.0 ng/mL    Narrative    Performed at:  [address] [address], [place], [place]  [id number]  : Allyson Londono MD, Phone:  [phone]       Assessment/ Plan:     Hina Umanzor was seen today for follow-up and foot pain. Diagnoses and all orders for this visit:    Essential hypertension  -     CBC W/O DIFF  -     METABOLIC PANEL, BASIC  -     PSA - PROSTATE SPECIFIC AG  -     COLLECTION VENOUS BLOOD,VENIPUNCTURE    Bilateral impacted cerumen  -     REMOVAL IMPACTED CERUMEN IRRIGATION/LVG UNILAT    Pure hyperglyceridemia         1. Essential hypertension    2. Bilateral impacted cerumen    3. Pure hyperglyceridemia        Orders Placed This Encounter    COLLECTION VENOUS BLOOD,VENIPUNCTURE    REMOVAL IMPACTED CERUMEN IRRIGATION/LVG UNILAT     Both ears    CBC W/O DIFF    METABOLIC PANEL, BASIC    PSA - PROSTATE SPECIFIC AG         Verbal and written instructions (see AVS) provided.  Patient expresses understanding of diagnosis and treatment plan.     Francisco Ly, SCOTC DISPLAY PLAN FREE TEXT

## 2019-09-13 NOTE — CHART NOTE - NSCHARTNOTEFT_GEN_A_CORE
NUTRITION FOLLOW-UP:  Pt. s/p repeat swallow evaluation 9/13/19 where oral nutrition/hydration/medication continues to be deemed contraindicated, per SLP recommendation.  Spoke with RN & physician.  Per MD, plan is to likely place NGT for initiation of enteral feedings.  Suggest Glucerna 1.2 enteral formula.  Also verbalized recommendation to adjust insulin regimen, particularly if TF to begin, given glucose values persistently elevated.            Pertinent Medications: MEDICATIONS  (STANDING):  ALBUTerol/ipratropium for Nebulization 3 milliLiter(s) Nebulizer every 6 hours  dextrose 5%. 1000 milliLiter(s) (50 mL/Hr) IV Continuous <Continuous>  dextrose 50% Injectable 12.5 Gram(s) IV Push once  dextrose 50% Injectable 25 Gram(s) IV Push once  dextrose 50% Injectable 25 Gram(s) IV Push once  heparin  Injectable 5000 Unit(s) SubCutaneous every 8 hours  insulin lispro (HumaLOG) corrective regimen sliding scale   SubCutaneous every 6 hours  lactated ringers. 1000 milliLiter(s) (75 mL/Hr) IV Continuous <Continuous>  pantoprazole  Injectable 40 milliGRAM(s) IV Push daily  piperacillin/tazobactam IVPB.. 3.375 Gram(s) IV Intermittent every 12 hours    MEDICATIONS  (PRN):  dextrose 40% Gel 15 Gram(s) Oral once PRN Blood Glucose LESS THAN 70 milliGRAM(s)/deciliter  glucagon  Injectable 1 milliGRAM(s) IntraMuscular once PRN Glucose LESS THAN 70 milligrams/deciliter  ondansetron Injectable 4 milliGRAM(s) IV Push every 6 hours PRN Nausea and/or Vomiting    Pertinent Labs:  09-13 Na144 mmol/L Glu 241 mg/dL<H> K+ 3.8 mmol/L Cr  3.62 mg/dL<H> BUN 66 mg/dL<H> 09-13 Phos 3.5 mg/dL 09-11 Alb 3.0 g/dL<L> 09-12 [id number] 7.7 %<H> 09-12 Chol 76 mg/dL<L> LDL 29 mg/dL HDL 34 mg/dL<L> Trig 68 mg/dL      CAPILLARY BLOOD GLUCOSE      POCT Blood Glucose.: 239 mg/dL (13 Sep 2019 06:14)  POCT Blood Glucose.: 273 mg/dL (12 Sep 2019 23:53)  POCT Blood Glucose.: 300 mg/dL (12 Sep 2019 18:01)  POCT Blood Glucose.: 340 mg/dL (12 Sep 2019 12:32)    Diet, NPO:   Except Medications (09-11-19 @ 07:38)      NUTRITION Dx:  Pt. remains moderately malnourished       PLAN/RECOMMENDATIONS:    1) If/when NGT placed and medically appropriate to use... initiate Glucerna 1.2 @ 20mL/hr then increase by 10mL q 4hrs, as tolerate, to goal of 45mL/hr x 24hrs.                 [provides 1296KCals & 65g protein, daily].  2) Obtain daily weights  3) RDN remains available and will f/u PRN.          Barbara Garrett RDN, CDN pager [phone]

## 2019-09-13 NOTE — PROGRESS NOTE ADULT - PROBLEM SELECTOR PLAN 1
Likely 2/2 aspiration PNA vs. pneumonitis vs CVA vs. cardiac etiology. HD stable, O2 sating well. Clinically improve with suctioning.    aspiration PNA  - on zosyn day 2 of 7, renally dosed. CXR clear, but given pt's hx, high risk for aspiration. HD stable, O2 sating well, clinically improving with suctioning. Chest PT    change in mental status concerning for CNS process  -CT head neg. Pt will not tolerate MRI. Out of window time period for TPA. Hba1c 7.7, lipid panel unremarkable. ECHO ordered. On statin, plavix, ARB already at home; currently held for dysphagia.    unlikely cardiac etiology  -although initial trop elevated at 131, repeat in 1.5 hrs is downtrend to 120. Likely elevated w/ pt's hx of CKD. EKG showing NSR and non specific t wave abnormalities; no ST elevations or depressions. BNP 1191; given pt's age, not highly concerning. CXR clear. However, must r/o CHF. ECHO ordered. s/p 1 dose of lasix 20mg IV.

## 2019-09-13 NOTE — PROGRESS NOTE ADULT - SUBJECTIVE AND OBJECTIVE BOX
Tawanda Mcdaniels, PGY-1  Internal Medicine  Pager [phone]    OVERNIGHT / SUBJECTIVE EVENTS:  -no acute events overnight  -Pt was seen and examined at bedside. Pt had no complaints. Denied f/c/n/v, CP, SOB, changes in vision, abdominal pain, dysuria, constipation, and diarrhea.    MEDICATIONS  (STANDING):  ALBUTerol/ipratropium for Nebulization 3 milliLiter(s) Nebulizer every 6 hours  dextrose 5%. 1000 milliLiter(s) (50 mL/Hr) IV Continuous <Continuous>  dextrose 50% Injectable 12.5 Gram(s) IV Push once  dextrose 50% Injectable 25 Gram(s) IV Push once  dextrose 50% Injectable 25 Gram(s) IV Push once  heparin  Injectable 5000 Unit(s) SubCutaneous every 8 hours  insulin lispro (HumaLOG) corrective regimen sliding scale   SubCutaneous every 6 hours  lactated ringers. 1000 milliLiter(s) (75 mL/Hr) IV Continuous <Continuous>  pantoprazole  Injectable 40 milliGRAM(s) IV Push daily  piperacillin/tazobactam IVPB.. 3.375 Gram(s) IV Intermittent every 12 hours    MEDICATIONS  (PRN):  dextrose 40% Gel 15 Gram(s) Oral once PRN Blood Glucose LESS THAN 70 milliGRAM(s)/deciliter  glucagon  Injectable 1 milliGRAM(s) IntraMuscular once PRN Glucose LESS THAN 70 milligrams/deciliter  ondansetron Injectable 4 milliGRAM(s) IV Push every 6 hours PRN Nausea and/or Vomiting      Allergies    latex (Unknown)  No Known Drug Allergies    Intolerances        OBJECTIVE:  Vital Signs Last 24 Hrs  T(C): 36.6 (13 Sep 2019 04:56), Max: 36.9 (12 Sep 2019 09:20)  T(F): 97.8 (13 Sep 2019 04:56), Max: 98.5 (12 Sep 2019 09:20)  HR: 90 (13 Sep 2019 04:56) (83 - 98)  BP: 143/68 (13 Sep 2019 04:56) (130/62 - 156/71)  BP(mean): --  RR: 17 (13 Sep 2019 04:56) (16 - 19)  SpO2: 100% (13 Sep 2019 04:56) (98% - 100%)      CAPILLARY BLOOD GLUCOSE      POCT Blood Glucose.: 239 mg/dL (13 Sep 2019 06:14)  POCT Blood Glucose.: 273 mg/dL (12 Sep 2019 23:53)  POCT Blood Glucose.: 300 mg/dL (12 Sep 2019 18:01)  POCT Blood Glucose.: 340 mg/dL (12 Sep 2019 12:32)    I&O's Summary      PHYSICAL EXAM:  GENERAL: NAD, well-developed  HEAD:  Atraumatic, Normocephalic  EYES: EOMI, PERRLA, conjunctiva and sclera clear  NECK: Supple, No JVD  CHEST/LUNG: Clear to auscultation bilaterally; No wheeze  HEART: Regular rate and rhythm; No murmurs, rubs, or gallops  ABDOMEN: Soft, Nontender, Nondistended; Bowel sounds present  EXTREMITIES:  2+ Peripheral Pulses, No clubbing, cyanosis, or edema  PSYCH: AAOx3  SKIN: No rashes or lesions    LABS:                        7.3    8.49  )-----------( 318      ( 13 Sep 2019 06:25 )             23.2     09-12    137  |  105  |  74<H>  ----------------------------<  329<H>  4.3   |  15<L>  |  4.03<H>    Ca    8.0<L>      12 Sep 2019 06:19  Phos  5.1     09-12  Mg     2.2     09-12        MICROBIOLOGY:     no new micro    RADIOLOGY & ADDITIONAL TESTS:  no new imaging    CONSULTS:  consult notes reviewed and discussed with respective teams Tawanda Mcdaniels, PGY-1  Internal Medicine  Pager [phone]    OVERNIGHT / SUBJECTIVE EVENTS:  -no acute events overnight  -Pt was seen and examined at bedside. Pt AAOx2, hiccups resolved this AM. Speech and swallow evaluated pt this AM and recommends pt remain NPO. Denied f/c/n/v, CP, SOB    MEDICATIONS  (STANDING):  ALBUTerol/ipratropium for Nebulization 3 milliLiter(s) Nebulizer every 6 hours  dextrose 5%. 1000 milliLiter(s) (50 mL/Hr) IV Continuous <Continuous>  dextrose 50% Injectable 12.5 Gram(s) IV Push once  dextrose 50% Injectable 25 Gram(s) IV Push once  dextrose 50% Injectable 25 Gram(s) IV Push once  heparin  Injectable 5000 Unit(s) SubCutaneous every 8 hours  insulin lispro (HumaLOG) corrective regimen sliding scale   SubCutaneous every 6 hours  lactated ringers. 1000 milliLiter(s) (75 mL/Hr) IV Continuous <Continuous>  pantoprazole  Injectable 40 milliGRAM(s) IV Push daily  piperacillin/tazobactam IVPB.. 3.375 Gram(s) IV Intermittent every 12 hours    MEDICATIONS  (PRN):  dextrose 40% Gel 15 Gram(s) Oral once PRN Blood Glucose LESS THAN 70 milliGRAM(s)/deciliter  glucagon  Injectable 1 milliGRAM(s) IntraMuscular once PRN Glucose LESS THAN 70 milligrams/deciliter  ondansetron Injectable 4 milliGRAM(s) IV Push every 6 hours PRN Nausea and/or Vomiting      Allergies    latex (Unknown)  No Known Drug Allergies    Intolerances        OBJECTIVE:  Vital Signs Last 24 Hrs  T(C): 36.6 (13 Sep 2019 04:56), Max: 36.9 (12 Sep 2019 09:20)  T(F): 97.8 (13 Sep 2019 04:56), Max: 98.5 (12 Sep 2019 09:20)  HR: 90 (13 Sep 2019 04:56) (83 - 98)  BP: 143/68 (13 Sep 2019 04:56) (130/62 - 156/71)  BP(mean): --  RR: 17 (13 Sep 2019 04:56) (16 - 19)  SpO2: 100% (13 Sep 2019 04:56) (98% - 100%)      CAPILLARY BLOOD GLUCOSE      POCT Blood Glucose.: 239 mg/dL (13 Sep 2019 06:14)  POCT Blood Glucose.: 273 mg/dL (12 Sep 2019 23:53)  POCT Blood Glucose.: 300 mg/dL (12 Sep 2019 18:01)  POCT Blood Glucose.: 340 mg/dL (12 Sep 2019 12:32)    I&O's Summary      PHYSICAL EXAM:  GENERAL: NAD, well-developed  HEAD:  Atraumatic, Normocephalic  EYES: EOMI, and sclera clear  NECK: Supple, No JVD  CHEST/LUNG: Clear to auscultation bilaterally; inspiratory wheeze R > L, no crackles  HEART: Regular rate and rhythm; No murmurs, rubs, or gallops  ABDOMEN: Soft, LLQ tender, Nondistended;  EXTREMITIES: No clubbing, cyanosis, or edema  NEURO: AAOx2, able to follow commands   SKIN: No rashes or lesions    LABS:                        7.3    8.49  )-----------( 318      ( 13 Sep 2019 06:25 )             23.2     09-12    137  |  105  |  74<H>  ----------------------------<  329<H>  4.3   |  15<L>  |  4.03<H>    Ca    8.0<L>      12 Sep 2019 06:19  Phos  5.1     09-12  Mg     2.2     09-12        MICROBIOLOGY:     no new micro    RADIOLOGY & ADDITIONAL TESTS:  no new imaging    CONSULTS:  consult notes reviewed and discussed with respective teams

## 2019-09-13 NOTE — SWALLOW BEDSIDE ASSESSMENT ADULT - ASR SWALLOW ASPIRATION MONITOR
oral hygiene/change of breathing pattern/position upright (90Y)/fever/gurgly voice/pneumonia/upper respiratory infection/cough/throat clearing
change of breathing pattern/position upright (90Y)/fever/throat clearing/pneumonia/upper respiratory infection/cough/oral hygiene/gurgly voice

## 2019-09-13 NOTE — SWALLOW BEDSIDE ASSESSMENT ADULT - SLP PERTINENT HISTORY OF CURRENT PROBLEM
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 Hx obtained from granddaughter at bedside. Prior to symptoms, pt able to converse, move around with assistance. 2 weeks ago, pt started having "chest spasms", granddaughter describes as hiccups. 3 days ago, pt became more somnolent, not speaking, becoming more bed bound, making gurgling sounds with breathing. Endorsed that pt afebrile at home. Had home health PA come evaluate pt. Granddaughter was told that CXR was clear and that chest spasms were due to alzheimer's medications, as a result pt's alzheimer's medications were held. Family decided to bring pt in today b/c symptoms were not improving". CXR completed on 9/11/19 reported "clear lungs". CTH completed reported "no acute intracranial bleeding".

## 2019-09-13 NOTE — SWALLOW BEDSIDE ASSESSMENT ADULT - ORAL PHASE
Stasis in lateral sulci/Delayed oral transit time/Decreased anterior-posterior movement of the bolus/Lingual stasis
Within functional limits

## 2019-09-14 LAB
ANION GAP SERPL CALC-SCNC: 15 MMO/L — HIGH (ref 7–14)
BUN SERPL-MCNC: 61 MG/DL — HIGH (ref 7–23)
CALCIUM SERPL-MCNC: 8.6 MG/DL — SIGNIFICANT CHANGE UP (ref 8.4–10.5)
CHLORIDE SERPL-SCNC: 114 MMOL/L — HIGH (ref 98–107)
CO2 SERPL-SCNC: 19 MMOL/L — LOW (ref 22–31)
CREAT SERPL-MCNC: 3.62 MG/DL — HIGH (ref 0.5–1.3)
GLUCOSE BLDC GLUCOMTR-MCNC: 111 MG/DL — HIGH (ref 70–99)
GLUCOSE BLDC GLUCOMTR-MCNC: 316 MG/DL — HIGH (ref 70–99)
GLUCOSE BLDC GLUCOMTR-MCNC: 324 MG/DL — HIGH (ref 70–99)
GLUCOSE SERPL-MCNC: 168 MG/DL — HIGH (ref 70–99)
HCT VFR BLD CALC: 24.9 % — LOW (ref 39–50)
HGB BLD-MCNC: 7.9 G/DL — LOW (ref 13–17)
MAGNESIUM SERPL-MCNC: 2.1 MG/DL — SIGNIFICANT CHANGE UP (ref 1.6–2.6)
MCHC RBC-ENTMCNC: 27.1 PG — SIGNIFICANT CHANGE UP (ref 27–34)
MCHC RBC-ENTMCNC: 31.7 % — LOW (ref 32–36)
MCV RBC AUTO: 85.3 FL — SIGNIFICANT CHANGE UP (ref 80–100)
NRBC # FLD: 0.02 K/UL — SIGNIFICANT CHANGE UP (ref 0–0)
PHOSPHATE SERPL-MCNC: 2.7 MG/DL — SIGNIFICANT CHANGE UP (ref 2.5–4.5)
PLATELET # BLD AUTO: 334 K/UL — SIGNIFICANT CHANGE UP (ref 150–400)
PMV BLD: 9.9 FL — SIGNIFICANT CHANGE UP (ref 7–13)
POTASSIUM SERPL-MCNC: 3.5 MMOL/L — SIGNIFICANT CHANGE UP (ref 3.5–5.3)
POTASSIUM SERPL-SCNC: 3.5 MMOL/L — SIGNIFICANT CHANGE UP (ref 3.5–5.3)
RBC # BLD: 2.92 M/UL — LOW (ref 4.2–5.8)
RBC # FLD: 13.5 % — SIGNIFICANT CHANGE UP (ref 10.3–14.5)
SODIUM SERPL-SCNC: 148 MMOL/L — HIGH (ref 135–145)
WBC # BLD: 8.38 K/UL — SIGNIFICANT CHANGE UP (ref 3.8–10.5)
WBC # FLD AUTO: 8.38 K/UL — SIGNIFICANT CHANGE UP (ref 3.8–10.5)

## 2019-09-14 PROCEDURE — 99233 SBSQ HOSP IP/OBS HIGH 50: CPT | Mod: GC

## 2019-09-14 PROCEDURE — 71045 X-RAY EXAM CHEST 1 VIEW: CPT | Mod: 26

## 2019-09-14 RX ADMIN — Medication 4: at 01:00

## 2019-09-14 RX ADMIN — PANTOPRAZOLE SODIUM 40 MILLIGRAM(S): 20 TABLET, DELAYED RELEASE ORAL at 11:15

## 2019-09-14 RX ADMIN — Medication 3 MILLILITER(S): at 03:27

## 2019-09-14 RX ADMIN — HUMAN INSULIN 3 UNIT(S): 100 INJECTION, SUSPENSION SUBCUTANEOUS at 23:52

## 2019-09-14 RX ADMIN — Medication 3 MILLILITER(S): at 23:26

## 2019-09-14 RX ADMIN — Medication 2: at 18:09

## 2019-09-14 RX ADMIN — HEPARIN SODIUM 5000 UNIT(S): 5000 INJECTION INTRAVENOUS; SUBCUTANEOUS at 14:07

## 2019-09-14 RX ADMIN — HEPARIN SODIUM 5000 UNIT(S): 5000 INJECTION INTRAVENOUS; SUBCUTANEOUS at 07:05

## 2019-09-14 RX ADMIN — PIPERACILLIN AND TAZOBACTAM 25 GRAM(S): 4; .5 INJECTION, POWDER, LYOPHILIZED, FOR SOLUTION INTRAVENOUS at 18:09

## 2019-09-14 RX ADMIN — HEPARIN SODIUM 5000 UNIT(S): 5000 INJECTION INTRAVENOUS; SUBCUTANEOUS at 21:32

## 2019-09-14 RX ADMIN — PIPERACILLIN AND TAZOBACTAM 25 GRAM(S): 4; .5 INJECTION, POWDER, LYOPHILIZED, FOR SOLUTION INTRAVENOUS at 07:04

## 2019-09-14 RX ADMIN — Medication 3 MILLILITER(S): at 16:09

## 2019-09-14 RX ADMIN — Medication 3 MILLILITER(S): at 10:53

## 2019-09-14 RX ADMIN — HUMAN INSULIN 3 UNIT(S): 100 INJECTION, SUSPENSION SUBCUTANEOUS at 18:09

## 2019-09-14 RX ADMIN — HUMAN INSULIN 3 UNIT(S): 100 INJECTION, SUSPENSION SUBCUTANEOUS at 00:59

## 2019-09-14 RX ADMIN — Medication 4: at 23:52

## 2019-09-14 NOTE — CHART NOTE - NSCHARTNOTEFT_GEN_A_CORE
Night float informed by nursing that patient pulled out his NG tube.    Pt seen and examined at bedside. Pt appears more alert than previous. Lying in bed, appearing comfortable. Patient vocalizing but speech difficult to understand. Able to express yes/no appropriately. Denies any discomfort. Brief physical exam performed.    GEN: NAD, frail appearing, elderly man lying in bed, with cough productive of thick, clear sputum  HEENT: NG tube no longer in place, no bleeding or injury noted around nares or oropharnyx, however large amount of sputum noted inside mouth  CVS: S1 and S2, RRR, no m/r/g  RESP: upper airway sounds appreciated b/l  EXT: no LE edema noted    As patient appears comfortable with no sign of injury from self-discontinuation of NG tube, no interventions ordered for now. Recommend improved oral hygiene and management of secretions.    Cornelio Doll, PGY-1  Night Float, HS-1,2,3  Pager [phone]

## 2019-09-14 NOTE — PROGRESS NOTE ADULT - SUBJECTIVE AND OBJECTIVE BOX
Eric Tran PGY-1   [facility] Pager # [phone]  NS Pager # [phone]      Patient is a 79y old  Male who presents with a chief complaint of respiratory distress and AMS (13 Sep 2019 07:31)      SUBJECTIVE: Patient agitated overnight, pulled out NGT at 1am, not replaced. Insulin held this AM. At bedside, patient able to follow commands but not oriented only to self.    REVIEW OF SYSTEMS:  Unable to obtain      MEDICATIONS  (STANDING):  ALBUTerol/ipratropium for Nebulization 3 milliLiter(s) Nebulizer every 6 hours  dextrose 5%. 1000 milliLiter(s) (50 mL/Hr) IV Continuous <Continuous>  dextrose 50% Injectable 12.5 Gram(s) IV Push once  dextrose 50% Injectable 25 Gram(s) IV Push once  dextrose 50% Injectable 25 Gram(s) IV Push once  heparin  Injectable 5000 Unit(s) SubCutaneous every 8 hours  insulin lispro (HumaLOG) corrective regimen sliding scale   SubCutaneous every 6 hours  insulin NPH human recombinant 3 Unit(s) SubCutaneous every 6 hours  pantoprazole  Injectable 40 milliGRAM(s) IV Push daily  piperacillin/tazobactam IVPB.. 3.375 Gram(s) IV Intermittent every 12 hours    MEDICATIONS  (PRN):  dextrose 40% Gel 15 Gram(s) Oral once PRN Blood Glucose LESS THAN 70 milliGRAM(s)/deciliter  glucagon  Injectable 1 milliGRAM(s) IntraMuscular once PRN Glucose LESS THAN 70 milligrams/deciliter  ondansetron Injectable 4 milliGRAM(s) IV Push every 6 hours PRN Nausea and/or Vomiting        Objective:    Vitals: Vital Signs Last 24 Hrs  T(C): 36.8 (09-14-19 @ 05:05), Max: 37.3 (09-13-19 @ 22:43)  T(F): 98.2 (09-14-19 @ 05:05), Max: 99.2 (09-13-19 @ 22:43)  HR: 98 (09-14-19 @ 05:05) (75 - 98)  BP: 144/75 (09-14-19 @ 05:05) (144/75 - 184/88)  BP(mean): --  RR: 18 (09-14-19 @ 05:05) (18 - 18)  SpO2: 96% (09-14-19 @ 05:05) (96% - 100%)            I&O's Summary    13 Sep 2019 07:01  -  14 Sep 2019 07:00  --------------------------------------------------------  IN: 50 mL / OUT: 900 mL / NET: -850 mL        PHYSICAL EXAM:  GENERAL: NAD, lying in bed calmly  HEAD:  Atraumatic, Normocephalic  CHEST/LUNG: Clear to auscultation bilaterally; No rales or wheezing  HEART: Regular rate and rhythm; No murmurs, rubs, or gallops  ABDOMEN: Soft, nontender, nondistended, bowel sounds present  EXTREMITIES:  No clubbing, cyanosis, or edema  LYMPH: No lymphadenopathy noted  SKIN: No rashes or lesions  NERVOUS SYSTEM:  Following simple commands, Alert & Oriented X1    LABS:  09-14    148<H>  |  114<H>  |  61<H>  ----------------------------<  168<H>  3.5   |  19<L>  |  3.62<H>  09-13    144  |  106  |  66<H>  ----------------------------<  241<H>  3.8   |  18<L>  |  3.62<H>  09-12    137  |  105  |  74<H>  ----------------------------<  329<H>  4.3   |  15<L>  |  4.03<H>    Ca    8.6      14 Sep 2019 06:00  Ca    8.2<L>      13 Sep 2019 06:25  Ca    8.0<L>      12 Sep 2019 06:19  Phos  2.7     09-14  Mg     2.1     09-14                                         7.9    8.38  )-----------( 334      ( 14 Sep 2019 06:00 )             24.9                         7.3    8.49  )-----------( 318      ( 13 Sep 2019 06:25 )             23.2                         7.4    4.72  )-----------( 272      ( 12 Sep 2019 06:19 )             23.4     CAPILLARY BLOOD GLUCOSE      POCT Blood Glucose.: 111 mg/dL (14 Sep 2019 06:40)  POCT Blood Glucose.: 316 mg/dL (14 Sep 2019 00:46)  POCT Blood Glucose.: 273 mg/dL (13 Sep 2019 17:59)  POCT Blood Glucose.: 250 mg/dL (13 Sep 2019 13:04)    RADIOLOGY:  Imaging Personally Reviewed: YES      Consultants involved in case: YES  Consultant(s) Notes Reviewed:  YES  Care Discussed with Consultants/Other Providers       Eric Tran, PGY-2

## 2019-09-14 NOTE — PROGRESS NOTE ADULT - PROBLEM SELECTOR PLAN 2
hx of dysphagia, on pureed diet at home. drooling on presentation, seems unable to swallow secretions. Improving with better mental status today  - concern for inability to protect airway; MICU evaluated pt, not MICU candidate; ENT scoped, found laryngeal edema, pooling secretions at glottic opening  - as per ENT, decadron and PPI  - speech and swallow to re evaluate pt today as mental status improving; NPO for now, aspiration precautions  - zofran PRN for nausea    #Hiccups  persistent hiccups  - trial of reglan 5mg q8 x 3 doses, renally dosed

## 2019-09-14 NOTE — PROGRESS NOTE ADULT - ATTENDING COMMENTS
79M hx of alzheimer's dementia, HTN, PVD, CKD stage III, T2DM, blindness p/w 3 days of metabolic encephalopathy and respiratory distress and increased secretions, likely aspiration PNA.    #Metabolic Encephalopathy   - Slowly improving mental status  - Per granddaughter at baseline patient is talkative, able to recognize family members and can walk to bathroom  - Possible CVA event: CT head unremarkable, but at this time patient will not be able to tolerate MRI due to copious secretions and tenuous respiratory status. Unable to get CTA Head/Neck due to elevated Cr.   - Patient afebrile, UA negative, CXR negative. Will c/w Zosyn for now given high likelihood of aspiration.     #Dysphagia/Aspiration   - NPO, aspiration precautions.   - ENT recs appreciated, s/p Decadron 8mg q8h x3 doses for possible edema   - S/S re-eval 9/13: NPO recommended. NG placed for feeding.  Reassess next week, discuss options with family if continued unable to take PO  - C/w Zosyn given high concern for aspiration event   - Hiccuping - some relief with reglan    #CKD likely stage 4  - Baseline Cr unknown, in 2014 ~2.4  - Cr stabilizing    #Low TSH   - Likely euthyroid sick syndrome, will need repeat TFTs in 6 weeks.     #Anemia   - stable, likely chronic disease/renal    #GOC - patient is full code at this time per discussion with granddaughter.

## 2019-09-14 NOTE — PROGRESS NOTE ADULT - PROBLEM SELECTOR PLAN 3
Cr 3.8 on admission. Unknown baseline. Pt has hx of CKD stage 3.  - unable to reach out to outpt PCP and nephrologist despite many attempts; will try to retrieve records from [facility]  - monitor Cr

## 2019-09-15 LAB
ANION GAP SERPL CALC-SCNC: 14 MMO/L — SIGNIFICANT CHANGE UP (ref 7–14)
BUN SERPL-MCNC: 58 MG/DL — HIGH (ref 7–23)
CALCIUM SERPL-MCNC: 8.3 MG/DL — LOW (ref 8.4–10.5)
CHLORIDE SERPL-SCNC: 111 MMOL/L — HIGH (ref 98–107)
CO2 SERPL-SCNC: 22 MMOL/L — SIGNIFICANT CHANGE UP (ref 22–31)
CREAT SERPL-MCNC: 3.41 MG/DL — HIGH (ref 0.5–1.3)
GLUCOSE SERPL-MCNC: 377 MG/DL — HIGH (ref 70–99)
HCT VFR BLD CALC: 25.7 % — LOW (ref 39–50)
HGB BLD-MCNC: 7.7 G/DL — LOW (ref 13–17)
MAGNESIUM SERPL-MCNC: 2.2 MG/DL — SIGNIFICANT CHANGE UP (ref 1.6–2.6)
MCHC RBC-ENTMCNC: 26.9 PG — LOW (ref 27–34)
MCHC RBC-ENTMCNC: 30 % — LOW (ref 32–36)
MCV RBC AUTO: 89.9 FL — SIGNIFICANT CHANGE UP (ref 80–100)
NRBC # FLD: 0 K/UL — SIGNIFICANT CHANGE UP (ref 0–0)
PHOSPHATE SERPL-MCNC: 2.7 MG/DL — SIGNIFICANT CHANGE UP (ref 2.5–4.5)
PLATELET # BLD AUTO: 305 K/UL — SIGNIFICANT CHANGE UP (ref 150–400)
PMV BLD: 10.2 FL — SIGNIFICANT CHANGE UP (ref 7–13)
POTASSIUM SERPL-MCNC: 4 MMOL/L — SIGNIFICANT CHANGE UP (ref 3.5–5.3)
POTASSIUM SERPL-SCNC: 4 MMOL/L — SIGNIFICANT CHANGE UP (ref 3.5–5.3)
RBC # BLD: 2.86 M/UL — LOW (ref 4.2–5.8)
RBC # FLD: 13.6 % — SIGNIFICANT CHANGE UP (ref 10.3–14.5)
SODIUM SERPL-SCNC: 147 MMOL/L — HIGH (ref 135–145)
WBC # BLD: 7.14 K/UL — SIGNIFICANT CHANGE UP (ref 3.8–10.5)
WBC # FLD AUTO: 7.14 K/UL — SIGNIFICANT CHANGE UP (ref 3.8–10.5)

## 2019-09-15 PROCEDURE — 99233 SBSQ HOSP IP/OBS HIGH 50: CPT | Mod: GC

## 2019-09-15 RX ORDER — DOCUSATE SODIUM 100 MG
100 CAPSULE ORAL
Refills: 0 | Status: DISCONTINUED | OUTPATIENT
Start: 2019-09-15 | End: 2019-09-15

## 2019-09-15 RX ORDER — POLYETHYLENE GLYCOL 3350 17 G/17G
17 POWDER, FOR SOLUTION ORAL
Refills: 0 | Status: DISCONTINUED | OUTPATIENT
Start: 2019-09-15 | End: 2019-09-15

## 2019-09-15 RX ORDER — DOCUSATE SODIUM 100 MG
50 CAPSULE ORAL
Refills: 0 | Status: DISCONTINUED | OUTPATIENT
Start: 2019-09-15 | End: 2019-09-18

## 2019-09-15 RX ORDER — HUMAN INSULIN 100 [IU]/ML
5 INJECTION, SUSPENSION SUBCUTANEOUS EVERY 6 HOURS
Refills: 0 | Status: DISCONTINUED | OUTPATIENT
Start: 2019-09-15 | End: 2019-09-16

## 2019-09-15 RX ORDER — POLYETHYLENE GLYCOL 3350 17 G/17G
17 POWDER, FOR SOLUTION ORAL
Refills: 0 | Status: DISCONTINUED | OUTPATIENT
Start: 2019-09-15 | End: 2019-09-18

## 2019-09-15 RX ADMIN — HUMAN INSULIN 5 UNIT(S): 100 INJECTION, SUSPENSION SUBCUTANEOUS at 12:47

## 2019-09-15 RX ADMIN — Medication 3: at 23:07

## 2019-09-15 RX ADMIN — Medication 4: at 06:32

## 2019-09-15 RX ADMIN — HUMAN INSULIN 5 UNIT(S): 100 INJECTION, SUSPENSION SUBCUTANEOUS at 18:11

## 2019-09-15 RX ADMIN — Medication 3 MILLILITER(S): at 16:16

## 2019-09-15 RX ADMIN — Medication 3: at 12:46

## 2019-09-15 RX ADMIN — PIPERACILLIN AND TAZOBACTAM 25 GRAM(S): 4; .5 INJECTION, POWDER, LYOPHILIZED, FOR SOLUTION INTRAVENOUS at 05:32

## 2019-09-15 RX ADMIN — Medication 50 MILLIGRAM(S): at 18:12

## 2019-09-15 RX ADMIN — HUMAN INSULIN 3 UNIT(S): 100 INJECTION, SUSPENSION SUBCUTANEOUS at 06:32

## 2019-09-15 RX ADMIN — Medication 3 MILLILITER(S): at 23:18

## 2019-09-15 RX ADMIN — Medication 3 MILLILITER(S): at 04:59

## 2019-09-15 RX ADMIN — HEPARIN SODIUM 5000 UNIT(S): 5000 INJECTION INTRAVENOUS; SUBCUTANEOUS at 12:47

## 2019-09-15 RX ADMIN — Medication 3 MILLILITER(S): at 09:35

## 2019-09-15 RX ADMIN — PANTOPRAZOLE SODIUM 40 MILLIGRAM(S): 20 TABLET, DELAYED RELEASE ORAL at 12:46

## 2019-09-15 RX ADMIN — HUMAN INSULIN 5 UNIT(S): 100 INJECTION, SUSPENSION SUBCUTANEOUS at 23:09

## 2019-09-15 RX ADMIN — POLYETHYLENE GLYCOL 3350 17 GRAM(S): 17 POWDER, FOR SOLUTION ORAL at 18:12

## 2019-09-15 RX ADMIN — Medication 3: at 18:11

## 2019-09-15 RX ADMIN — HEPARIN SODIUM 5000 UNIT(S): 5000 INJECTION INTRAVENOUS; SUBCUTANEOUS at 05:32

## 2019-09-15 RX ADMIN — PIPERACILLIN AND TAZOBACTAM 25 GRAM(S): 4; .5 INJECTION, POWDER, LYOPHILIZED, FOR SOLUTION INTRAVENOUS at 18:12

## 2019-09-15 RX ADMIN — HEPARIN SODIUM 5000 UNIT(S): 5000 INJECTION INTRAVENOUS; SUBCUTANEOUS at 21:47

## 2019-09-15 NOTE — PROGRESS NOTE ADULT - PROBLEM SELECTOR PLAN 1
Likely 2/2 aspiration PNA vs. pneumonitis vs CVA vs. cardiac etiology. HD stable, O2 sating well. Clinically improve with suctioning.    aspiration PNA  - on zosyn day 5 of 7, renally dosed. CXR clear, but given pt's hx, high risk for aspiration. HD stable, O2 sating well, clinically improving with suctioning. Chest PT    change in mental status concerning for CNS process  -CT head neg. Pt will not tolerate MRI. Out of window time period for TPA. Hba1c 7.7, lipid panel unremarkable. ECHO ordered. On statin, plavix, ARB already at home; currently held for dysphagia.    unlikely cardiac etiology  -although initial trop elevated at 131, repeat in 1.5 hrs is downtrend to 120. Likely elevated w/ pt's hx of CKD. EKG showing NSR and non specific t wave abnormalities; no ST elevations or depressions. BNP 1191; given pt's age, not highly concerning. CXR clear. However, must r/o CHF. ECHO ordered. s/p 1 dose of lasix 20mg IV.

## 2019-09-15 NOTE — PROGRESS NOTE ADULT - PROBLEM SELECTOR PLAN 6
protein/calorie malnutrition.  - speech and swallow recommends pt remain NPO for now  - nutrition consult suggest Glucerna 1.2 enteral formula with NGT and adjust insulin

## 2019-09-15 NOTE — PROGRESS NOTE ADULT - PROBLEM SELECTOR PLAN 3
Cr 3.8 on admission. Unknown baseline. Pt has hx of CKD stage 3.  - unable to reach out to outpt PCP and nephrologist despite many attempts; will try to retrieve records from [facility]  - monitor Cr Cr 3.8 on admission. Unknown baseline. Pt has hx of CKD stage 3. Downtrending.  - unable to reach out to outpt PCP and nephrologist despite many attempts; will try to retrieve records from [facility]  - monitor Cr

## 2019-09-15 NOTE — PROGRESS NOTE ADULT - PROBLEM SELECTOR PLAN 4
On Humulin 70/30 at home with 25 units in AM and 15 units in evening. HbA1c 7.7. -324  - pt now receiving NGT; NPH 5 units q6

## 2019-09-15 NOTE — PROGRESS NOTE ADULT - SUBJECTIVE AND OBJECTIVE BOX
Tawanda Mcdaniels, PGY-1  Internal Medicine  Pager [phone]    OVERNIGHT / SUBJECTIVE EVENTS:  -no acute events overnight  -Pt was seen and examined at bedside. Pt had no complaints. Denied f/c/n/v, CP, SOB, changes in vision, abdominal pain, dysuria, constipation, and diarrhea.    MEDICATIONS  (STANDING):  ALBUTerol/ipratropium for Nebulization 3 milliLiter(s) Nebulizer every 6 hours  dextrose 5%. 1000 milliLiter(s) (50 mL/Hr) IV Continuous <Continuous>  dextrose 50% Injectable 12.5 Gram(s) IV Push once  dextrose 50% Injectable 25 Gram(s) IV Push once  dextrose 50% Injectable 25 Gram(s) IV Push once  heparin  Injectable 5000 Unit(s) SubCutaneous every 8 hours  insulin lispro (HumaLOG) corrective regimen sliding scale   SubCutaneous every 6 hours  insulin NPH human recombinant 5 Unit(s) SubCutaneous every 6 hours  pantoprazole  Injectable 40 milliGRAM(s) IV Push daily  piperacillin/tazobactam IVPB.. 3.375 Gram(s) IV Intermittent every 12 hours    MEDICATIONS  (PRN):  dextrose 40% Gel 15 Gram(s) Oral once PRN Blood Glucose LESS THAN 70 milliGRAM(s)/deciliter  glucagon  Injectable 1 milliGRAM(s) IntraMuscular once PRN Glucose LESS THAN 70 milligrams/deciliter  ondansetron Injectable 4 milliGRAM(s) IV Push every 6 hours PRN Nausea and/or Vomiting      Allergies    latex (Unknown)  No Known Drug Allergies    Intolerances        OBJECTIVE:  Vital Signs Last 24 Hrs  T(C): 36.3 (15 Sep 2019 05:38), Max: 37.2 (14 Sep 2019 15:54)  T(F): 97.4 (15 Sep 2019 05:38), Max: 99 (14 Sep 2019 15:54)  HR: 98 (15 Sep 2019 05:38) (74 - 106)  BP: 160/66 (15 Sep 2019 05:38) (131/60 - 167/87)  BP(mean): --  RR: 16 (15 Sep 2019 05:38) (16 - 18)  SpO2: 100% (15 Sep 2019 05:38) (100% - 100%)      09-14 @ 07:01  -  09-15 @ 07:00  --------------------------------------------------------  IN: 200 mL / OUT: 0 mL / NET: 200 mL      CAPILLARY BLOOD GLUCOSE      POCT Blood Glucose.: 317 mg/dL (15 Sep 2019 06:10)  POCT Blood Glucose.: 324 mg/dL (14 Sep 2019 23:49)  POCT Blood Glucose.: 234 mg/dL (14 Sep 2019 18:04)  POCT Blood Glucose.: 121 mg/dL (14 Sep 2019 12:12)    I&O's Summary    14 Sep 2019 07:01  -  15 Sep 2019 07:00  --------------------------------------------------------  IN: 200 mL / OUT: 0 mL / NET: 200 mL        PHYSICAL EXAM:  GENERAL: NAD, well-developed  HEAD:  Atraumatic, Normocephalic  EYES: EOMI, PERRLA, conjunctiva and sclera clear  NECK: Supple, No JVD  CHEST/LUNG: Clear to auscultation bilaterally; No wheeze  HEART: Regular rate and rhythm; No murmurs, rubs, or gallops  ABDOMEN: Soft, Nontender, Nondistended; Bowel sounds present  EXTREMITIES:  2+ Peripheral Pulses, No clubbing, cyanosis, or edema  PSYCH: AAOx3  SKIN: No rashes or lesions    LABS:                        7.9    8.38  )-----------( 334      ( 14 Sep 2019 06:00 )             24.9     09-14    148<H>  |  114<H>  |  61<H>  ----------------------------<  168<H>  3.5   |  19<L>  |  3.62<H>    Ca    8.6      14 Sep 2019 06:00  Phos  2.7     09-14  Mg     2.1     09-14                          MICROBIOLOGY:     no new micro    RADIOLOGY & ADDITIONAL TESTS:  no new imaging    CONSULTS:  consult notes reviewed and discussed with respective teams Tawanda Mcdaniels, PGY-1  Internal Medicine  Pager [phone]    OVERNIGHT / SUBJECTIVE EVENTS:  -no acute events overnight  -Pt was seen and examined at bedside. NGT in place. AAOx0 this AM, complaining of pain from hiccups. Difficult to assess ROS.    MEDICATIONS  (STANDING):  ALBUTerol/ipratropium for Nebulization 3 milliLiter(s) Nebulizer every 6 hours  dextrose 5%. 1000 milliLiter(s) (50 mL/Hr) IV Continuous <Continuous>  dextrose 50% Injectable 12.5 Gram(s) IV Push once  dextrose 50% Injectable 25 Gram(s) IV Push once  dextrose 50% Injectable 25 Gram(s) IV Push once  heparin  Injectable 5000 Unit(s) SubCutaneous every 8 hours  insulin lispro (HumaLOG) corrective regimen sliding scale   SubCutaneous every 6 hours  insulin NPH human recombinant 5 Unit(s) SubCutaneous every 6 hours  pantoprazole  Injectable 40 milliGRAM(s) IV Push daily  piperacillin/tazobactam IVPB.. 3.375 Gram(s) IV Intermittent every 12 hours    MEDICATIONS  (PRN):  dextrose 40% Gel 15 Gram(s) Oral once PRN Blood Glucose LESS THAN 70 milliGRAM(s)/deciliter  glucagon  Injectable 1 milliGRAM(s) IntraMuscular once PRN Glucose LESS THAN 70 milligrams/deciliter  ondansetron Injectable 4 milliGRAM(s) IV Push every 6 hours PRN Nausea and/or Vomiting      Allergies    latex (Unknown)  No Known Drug Allergies    Intolerances        OBJECTIVE:  Vital Signs Last 24 Hrs  T(C): 36.3 (15 Sep 2019 05:38), Max: 37.2 (14 Sep 2019 15:54)  T(F): 97.4 (15 Sep 2019 05:38), Max: 99 (14 Sep 2019 15:54)  HR: 98 (15 Sep 2019 05:38) (74 - 106)  BP: 160/66 (15 Sep 2019 05:38) (131/60 - 167/87)  BP(mean): --  RR: 16 (15 Sep 2019 05:38) (16 - 18)  SpO2: 100% (15 Sep 2019 05:38) (100% - 100%)      09-14 @ 07:01  -  09-15 @ 07:00  --------------------------------------------------------  IN: 200 mL / OUT: 0 mL / NET: 200 mL      CAPILLARY BLOOD GLUCOSE      POCT Blood Glucose.: 317 mg/dL (15 Sep 2019 06:10)  POCT Blood Glucose.: 324 mg/dL (14 Sep 2019 23:49)  POCT Blood Glucose.: 234 mg/dL (14 Sep 2019 18:04)  POCT Blood Glucose.: 121 mg/dL (14 Sep 2019 12:12)    I&O's Summary    14 Sep 2019 07:01  -  15 Sep 2019 07:00  --------------------------------------------------------  IN: 200 mL / OUT: 0 mL / NET: 200 mL        PHYSICAL EXAM:  GENERAL: NAD, well-developed  HEAD:  Atraumatic, Normocephalic  NECK: Supple, No JVD  CHEST/LUNG: Clear to auscultation bilaterally in anterior fields; No wheeze  HEART: Regular rate and rhythm; No murmurs, rubs, or gallops  ABDOMEN: Soft, Nontender, Nondistended  EXTREMITIES: No clubbing, cyanosis, or edema  PSYCH: AAOx0    LABS:                        7.9    8.38  )-----------( 334      ( 14 Sep 2019 06:00 )             24.9     09-14    148<H>  |  114<H>  |  61<H>  ----------------------------<  168<H>  3.5   |  19<L>  |  3.62<H>    Ca    8.6      14 Sep 2019 06:00  Phos  2.7     09-14  Mg     2.1     09-14                          MICROBIOLOGY:     no new micro    RADIOLOGY & ADDITIONAL TESTS:  no new imaging    CONSULTS:  consult notes reviewed and discussed with respective teams

## 2019-09-15 NOTE — PROGRESS NOTE ADULT - PROBLEM SELECTOR PLAN 5
Hgb 9.3. Unknown baseline. Likely due to anemia of chronic disease w/ hx of CKD. Hgb stable. B12, folate wnl.   -monitor H/H    #low TSH  likely subclinical

## 2019-09-15 NOTE — PROGRESS NOTE ADULT - PROBLEM SELECTOR PLAN 2
hx of dysphagia, on pureed diet at home. drooling on presentation, seems unable to swallow secretions. Improving mental status  - concern for inability to protect airway; MICU evaluated pt, not MICU candidate; ENT scoped, found laryngeal edema, pooling secretions at glottic opening  - as per ENT, decadron and PPI  - speech and swallow recommend pt remain NPO for now, aspiration precautions  - zofran PRN for nausea    #Hiccups  persistent hiccups; improving, now intermittent  - trial of reglan 5mg q8 x 3 doses, renally dosed

## 2019-09-16 LAB
ANION GAP SERPL CALC-SCNC: 11 MMO/L — SIGNIFICANT CHANGE UP (ref 7–14)
BUN SERPL-MCNC: 57 MG/DL — HIGH (ref 7–23)
CALCIUM SERPL-MCNC: 8.3 MG/DL — LOW (ref 8.4–10.5)
CHLORIDE SERPL-SCNC: 114 MMOL/L — HIGH (ref 98–107)
CO2 SERPL-SCNC: 22 MMOL/L — SIGNIFICANT CHANGE UP (ref 22–31)
CREAT SERPL-MCNC: 3.46 MG/DL — HIGH (ref 0.5–1.3)
GLUCOSE BLDC GLUCOMTR-MCNC: 223 MG/DL — HIGH (ref 70–99)
GLUCOSE BLDC GLUCOMTR-MCNC: 235 MG/DL — HIGH (ref 70–99)
GLUCOSE BLDC GLUCOMTR-MCNC: 292 MG/DL — HIGH (ref 70–99)
GLUCOSE SERPL-MCNC: 239 MG/DL — HIGH (ref 70–99)
HCT VFR BLD CALC: 25.9 % — LOW (ref 39–50)
HGB BLD-MCNC: 7.9 G/DL — LOW (ref 13–17)
MAGNESIUM SERPL-MCNC: 2.2 MG/DL — SIGNIFICANT CHANGE UP (ref 1.6–2.6)
MCHC RBC-ENTMCNC: 26.9 PG — LOW (ref 27–34)
MCHC RBC-ENTMCNC: 30.5 % — LOW (ref 32–36)
MCV RBC AUTO: 88.1 FL — SIGNIFICANT CHANGE UP (ref 80–100)
NRBC # FLD: 0.02 K/UL — SIGNIFICANT CHANGE UP (ref 0–0)
PHOSPHATE SERPL-MCNC: 2.7 MG/DL — SIGNIFICANT CHANGE UP (ref 2.5–4.5)
PLATELET # BLD AUTO: 298 K/UL — SIGNIFICANT CHANGE UP (ref 150–400)
PMV BLD: 10 FL — SIGNIFICANT CHANGE UP (ref 7–13)
POTASSIUM SERPL-MCNC: 4.4 MMOL/L — SIGNIFICANT CHANGE UP (ref 3.5–5.3)
POTASSIUM SERPL-SCNC: 4.4 MMOL/L — SIGNIFICANT CHANGE UP (ref 3.5–5.3)
RBC # BLD: 2.94 M/UL — LOW (ref 4.2–5.8)
RBC # FLD: 13.9 % — SIGNIFICANT CHANGE UP (ref 10.3–14.5)
SODIUM SERPL-SCNC: 147 MMOL/L — HIGH (ref 135–145)
WBC # BLD: 6.85 K/UL — SIGNIFICANT CHANGE UP (ref 3.8–10.5)
WBC # FLD AUTO: 6.85 K/UL — SIGNIFICANT CHANGE UP (ref 3.8–10.5)

## 2019-09-16 PROCEDURE — 99233 SBSQ HOSP IP/OBS HIGH 50: CPT | Mod: GC

## 2019-09-16 RX ORDER — LACTULOSE 10 G/15ML
20 SOLUTION ORAL DAILY
Refills: 0 | Status: DISCONTINUED | OUTPATIENT
Start: 2019-09-16 | End: 2019-09-18

## 2019-09-16 RX ORDER — PIPERACILLIN AND TAZOBACTAM 4; .5 G/20ML; G/20ML
3.38 INJECTION, POWDER, LYOPHILIZED, FOR SOLUTION INTRAVENOUS EVERY 12 HOURS
Refills: 0 | Status: COMPLETED | OUTPATIENT
Start: 2019-09-16 | End: 2019-09-18

## 2019-09-16 RX ORDER — HUMAN INSULIN 100 [IU]/ML
7 INJECTION, SUSPENSION SUBCUTANEOUS EVERY 6 HOURS
Refills: 0 | Status: DISCONTINUED | OUTPATIENT
Start: 2019-09-16 | End: 2019-09-18

## 2019-09-16 RX ADMIN — POLYETHYLENE GLYCOL 3350 17 GRAM(S): 17 POWDER, FOR SOLUTION ORAL at 17:21

## 2019-09-16 RX ADMIN — HEPARIN SODIUM 5000 UNIT(S): 5000 INJECTION INTRAVENOUS; SUBCUTANEOUS at 21:42

## 2019-09-16 RX ADMIN — Medication 50 MILLIGRAM(S): at 06:04

## 2019-09-16 RX ADMIN — HUMAN INSULIN 7 UNIT(S): 100 INJECTION, SUSPENSION SUBCUTANEOUS at 23:09

## 2019-09-16 RX ADMIN — POLYETHYLENE GLYCOL 3350 17 GRAM(S): 17 POWDER, FOR SOLUTION ORAL at 06:06

## 2019-09-16 RX ADMIN — Medication 3: at 23:08

## 2019-09-16 RX ADMIN — PANTOPRAZOLE SODIUM 40 MILLIGRAM(S): 20 TABLET, DELAYED RELEASE ORAL at 13:02

## 2019-09-16 RX ADMIN — HEPARIN SODIUM 5000 UNIT(S): 5000 INJECTION INTRAVENOUS; SUBCUTANEOUS at 06:07

## 2019-09-16 RX ADMIN — Medication 3 MILLILITER(S): at 16:14

## 2019-09-16 RX ADMIN — HUMAN INSULIN 7 UNIT(S): 100 INJECTION, SUSPENSION SUBCUTANEOUS at 18:27

## 2019-09-16 RX ADMIN — Medication 2: at 13:02

## 2019-09-16 RX ADMIN — Medication 3 MILLILITER(S): at 10:37

## 2019-09-16 RX ADMIN — HUMAN INSULIN 5 UNIT(S): 100 INJECTION, SUSPENSION SUBCUTANEOUS at 06:10

## 2019-09-16 RX ADMIN — PIPERACILLIN AND TAZOBACTAM 25 GRAM(S): 4; .5 INJECTION, POWDER, LYOPHILIZED, FOR SOLUTION INTRAVENOUS at 06:11

## 2019-09-16 RX ADMIN — HEPARIN SODIUM 5000 UNIT(S): 5000 INJECTION INTRAVENOUS; SUBCUTANEOUS at 13:02

## 2019-09-16 RX ADMIN — Medication 2: at 06:09

## 2019-09-16 RX ADMIN — Medication 3 MILLILITER(S): at 21:21

## 2019-09-16 RX ADMIN — PIPERACILLIN AND TAZOBACTAM 25 GRAM(S): 4; .5 INJECTION, POWDER, LYOPHILIZED, FOR SOLUTION INTRAVENOUS at 17:21

## 2019-09-16 RX ADMIN — Medication 50 MILLIGRAM(S): at 17:21

## 2019-09-16 RX ADMIN — Medication 3 MILLILITER(S): at 04:29

## 2019-09-16 RX ADMIN — Medication 2: at 18:28

## 2019-09-16 RX ADMIN — HUMAN INSULIN 7 UNIT(S): 100 INJECTION, SUSPENSION SUBCUTANEOUS at 13:03

## 2019-09-16 NOTE — PROGRESS NOTE ADULT - PROBLEM SELECTOR PLAN 4
On Humulin 70/30 at home with 25 units in AM and 15 units in evening. HbA1c 7.7. -289  - pt now receiving NGT; NPH 5 units q6 On Humulin 70/30 at home with 25 units in AM and 15 units in evening. HbA1c 7.7. -289  - pt now receiving NGT; increased to NPH 7 units q6

## 2019-09-16 NOTE — PROGRESS NOTE ADULT - PROBLEM SELECTOR PLAN 3
Cr 3.8 on admission. Unknown baseline. Pt has hx of CKD stage 3. Downtrending.  - unable to reach out to outpt PCP and nephrologist despite many attempts; will try to retrieve records from [facility]  - monitor Cr    #Hypernatremia  - titrate free water  - continue to monitor Cr 3.8 on admission. Unknown baseline. Pt has hx of CKD stage 3. Downtrending.  - unable to reach out to outpt PCP and nephrologist despite many attempts; will try to retrieve records from [facility]  - monitor Cr    #Hypernatremia  - titrate free water, increased to 300ml q6 as sodium was 147 today  - continue to monitor

## 2019-09-16 NOTE — PROGRESS NOTE ADULT - PROBLEM SELECTOR PLAN 5
Hgb 9.3. Unknown baseline. Likely due to anemia of chronic disease w/ hx of CKD. Hgb stable around 7-8. B12, folate wnl.   -monitor H/H    #low TSH  likely subclinical

## 2019-09-16 NOTE — PROGRESS NOTE ADULT - PROBLEM SELECTOR PLAN 2
hx of dysphagia, on pureed diet at home. drooling on presentation, seems unable to swallow secretions. Improving mental status  - concern for inability to protect airway; MICU evaluated pt, not MICU candidate; ENT scoped, found laryngeal edema, pooling secretions at glottic opening  - as per ENT, decadron and PPI  - speech and swallow recommend pt remain NPO for now, aspiration precautions  - zofran PRN for nausea    #Hiccups  persistent hiccups; improving, now intermittent  - trial of reglan 5mg q8 x 3 doses, renally dosed hx of dysphagia, on pureed diet at home. drooling on presentation, seems unable to swallow secretions. Improving mental status  - concern for inability to protect airway; MICU evaluated pt, not MICU candidate; ENT scoped, found laryngeal edema, pooling secretions at glottic opening  - as per ENT, decadron and PPI  - speech and swallow recommend pt remain NPO for now, aspiration precautions  - zofran PRN for nausea  - GOC discussion with family about PEG    #Hiccups  persistent hiccups; improving, now intermittent  - trial of reglan 5mg q8 x 3 doses, renally dosed

## 2019-09-16 NOTE — PROGRESS NOTE ADULT - ATTENDING COMMENTS
79M hx of alzheimer's dementia, HTN, PVD, CKD stage III, T2DM, blindness p/w 3 days of metabolic encephalopathy and respiratory distress and increased secretions, likely aspiration PNA.    #Metabolic Encephalopathy   - Possible CVA event: CT head unremarkable, but at this time patient will not be able to tolerate MRI due to copious secretions and tenuous respiratory status. Unable to get CTA Head/Neck due to elevated Cr.   - Patient afebrile, UA negative, CXR negative. Will c/w Zosyn for now given high likelihood of aspiration x 7 days      #Dysphagia/Aspiration   - NPO, aspiration precautions.   - ENT recs appreciated, s/p Decadron 8mg q8h x3 doses for possible edema   - S/S re-eval 9/13: NPO recommended. NG placed for feeding. Will c/w GOC discussions (re: PEG) with family if continued unable to take PO.  - C/w Zosyn given high concern for aspiration event   - Hiccuping - some relief with reglan    #CKD likely stage 4  - Baseline Cr 3.3 in Aug   - Cr stabilizing    #Low TSH   - Likely euthyroid sick syndrome, will need repeat TFTs in 6 weeks.     #Anemia   - stable, likely chronic disease/renal    #GOC - patient is full code at this time per discussion with granddaughter.

## 2019-09-16 NOTE — PROGRESS NOTE ADULT - PROBLEM SELECTOR PLAN 9
Discussed with granddaughter about code status. Currently full code.  - pt's wife endorsed that pt would want to be DNR/DNI, but family wants pt to be full code. We had a discussion of the consequences of being intubated as well as chest compressions, including being unable to come off the machine or death. Family understood and wants pt full code Discussed with granddaughter about code status. Currently full code.  - pt's wife endorsed that pt would want to be DNR/DNI, but family wants pt to be full code. We had a discussion of the consequences of being intubated as well as chest compressions, including being unable to come off the machine or death. Family understood and wants pt full code.    Discuss plans for PEG with family

## 2019-09-16 NOTE — CHART NOTE - NSCHARTNOTEFT_GEN_A_CORE
Got in contact with pt's outpatient clinic. PCP is out on leave, but spoke to colleague physician of Dr. Brewer.    Pt was admitted to [facility] around 8/15/19 for dehydration. Patient was discharged from hospital with , Cr 3.3, Hgb 9.8, WBC 16.7. CT non con at that time showed thickening of distal esophagus, raising suspicion for esophagitis or neoplasm. Patient was supposed to follow up outpatient, but had cancelled the apt.

## 2019-09-16 NOTE — PROGRESS NOTE ADULT - SUBJECTIVE AND OBJECTIVE BOX
Tawanda Mcdaniels, PGY-1  Internal Medicine  Pager [phone]    OVERNIGHT / SUBJECTIVE EVENTS:  -no acute events overnight  -Pt was seen and examined at bedside. Pt had no complaints. Denied f/c/n/v, CP, SOB, changes in vision, abdominal pain, dysuria, constipation, and diarrhea.    MEDICATIONS  (STANDING):  ALBUTerol/ipratropium for Nebulization 3 milliLiter(s) Nebulizer every 6 hours  dextrose 5%. 1000 milliLiter(s) (50 mL/Hr) IV Continuous <Continuous>  dextrose 50% Injectable 12.5 Gram(s) IV Push once  dextrose 50% Injectable 25 Gram(s) IV Push once  dextrose 50% Injectable 25 Gram(s) IV Push once  docusate sodium Liquid 50 milliGRAM(s) Oral two times a day  heparin  Injectable 5000 Unit(s) SubCutaneous every 8 hours  insulin lispro (HumaLOG) corrective regimen sliding scale   SubCutaneous every 6 hours  insulin NPH human recombinant 5 Unit(s) SubCutaneous every 6 hours  pantoprazole  Injectable 40 milliGRAM(s) IV Push daily  polyethylene glycol 3350 17 Gram(s) Oral two times a day    MEDICATIONS  (PRN):  dextrose 40% Gel 15 Gram(s) Oral once PRN Blood Glucose LESS THAN 70 milliGRAM(s)/deciliter  glucagon  Injectable 1 milliGRAM(s) IntraMuscular once PRN Glucose LESS THAN 70 milligrams/deciliter  ondansetron Injectable 4 milliGRAM(s) IV Push every 6 hours PRN Nausea and/or Vomiting      Allergies    latex (Unknown)  No Known Drug Allergies    Intolerances        OBJECTIVE:  Vital Signs Last 24 Hrs  T(C): 36.6 (16 Sep 2019 05:58), Max: 36.6 (15 Sep 2019 21:34)  T(F): 97.9 (16 Sep 2019 05:58), Max: 97.9 (16 Sep 2019 05:58)  HR: 94 (16 Sep 2019 05:58) (87 - 94)  BP: 161/69 (16 Sep 2019 05:58) (147/60 - 161/69)  BP(mean): --  RR: 18 (16 Sep 2019 05:58) (18 - 18)  SpO2: 99% (16 Sep 2019 05:58) (98% - 99%)      09-14 @ 07:01  -  09-15 @ 07:00  --------------------------------------------------------  IN: 500 mL / OUT: 0 mL / NET: 500 mL    09-15 @ 07:01  -  09-16 @ 06:44  --------------------------------------------------------  IN: 1345 mL / OUT: 0 mL / NET: 1345 mL      CAPILLARY BLOOD GLUCOSE      POCT Blood Glucose.: 236 mg/dL (16 Sep 2019 06:00)  POCT Blood Glucose.: 282 mg/dL (15 Sep 2019 23:05)  POCT Blood Glucose.: 280 mg/dL (15 Sep 2019 17:55)  POCT Blood Glucose.: 289 mg/dL (15 Sep 2019 12:43)    I&O's Summary    14 Sep 2019 07:01  -  15 Sep 2019 07:00  --------------------------------------------------------  IN: 500 mL / OUT: 0 mL / NET: 500 mL    15 Sep 2019 07:01  -  16 Sep 2019 06:44  --------------------------------------------------------  IN: 1345 mL / OUT: 0 mL / NET: 1345 mL        PHYSICAL EXAM:  GENERAL: NAD, well-developed  HEAD:  Atraumatic, Normocephalic  EYES: EOMI, PERRLA, conjunctiva and sclera clear  NECK: Supple, No JVD  CHEST/LUNG: Clear to auscultation bilaterally; No wheeze  HEART: Regular rate and rhythm; No murmurs, rubs, or gallops  ABDOMEN: Soft, Nontender, Nondistended; Bowel sounds present  EXTREMITIES:  2+ Peripheral Pulses, No clubbing, cyanosis, or edema  PSYCH: AAOx3  SKIN: No rashes or lesions    LABS:                        7.7    7.14  )-----------( 305      ( 15 Sep 2019 06:30 )             25.7     09-15    147<H>  |  111<H>  |  58<H>  ----------------------------<  377<H>  4.0   |  22  |  3.41<H>    Ca    8.3<L>      15 Sep 2019 06:30  Phos  2.7     09-15  Mg     2.2     09-15                          MICROBIOLOGY:     no new micro    RADIOLOGY & ADDITIONAL TESTS:  no new imaging    CONSULTS:  consult notes reviewed and discussed with respective teams Tawanda Mcdaniels, PGY-1  Internal Medicine  Pager [phone]    OVERNIGHT / SUBJECTIVE EVENTS:  -no acute events overnight  -Pt was seen and examined at bedside. Pt AAOx0 this AM, difficult to wake, responds to painful stimuli. Responds with grunts and head nods. Denies any pain or discomfort.     MEDICATIONS  (STANDING):  ALBUTerol/ipratropium for Nebulization 3 milliLiter(s) Nebulizer every 6 hours  dextrose 5%. 1000 milliLiter(s) (50 mL/Hr) IV Continuous <Continuous>  dextrose 50% Injectable 12.5 Gram(s) IV Push once  dextrose 50% Injectable 25 Gram(s) IV Push once  dextrose 50% Injectable 25 Gram(s) IV Push once  docusate sodium Liquid 50 milliGRAM(s) Oral two times a day  heparin  Injectable 5000 Unit(s) SubCutaneous every 8 hours  insulin lispro (HumaLOG) corrective regimen sliding scale   SubCutaneous every 6 hours  insulin NPH human recombinant 5 Unit(s) SubCutaneous every 6 hours  pantoprazole  Injectable 40 milliGRAM(s) IV Push daily  polyethylene glycol 3350 17 Gram(s) Oral two times a day    MEDICATIONS  (PRN):  dextrose 40% Gel 15 Gram(s) Oral once PRN Blood Glucose LESS THAN 70 milliGRAM(s)/deciliter  glucagon  Injectable 1 milliGRAM(s) IntraMuscular once PRN Glucose LESS THAN 70 milligrams/deciliter  ondansetron Injectable 4 milliGRAM(s) IV Push every 6 hours PRN Nausea and/or Vomiting      Allergies    latex (Unknown)  No Known Drug Allergies    Intolerances        OBJECTIVE:  Vital Signs Last 24 Hrs  T(C): 36.6 (16 Sep 2019 05:58), Max: 36.6 (15 Sep 2019 21:34)  T(F): 97.9 (16 Sep 2019 05:58), Max: 97.9 (16 Sep 2019 05:58)  HR: 94 (16 Sep 2019 05:58) (87 - 94)  BP: 161/69 (16 Sep 2019 05:58) (147/60 - 161/69)  BP(mean): --  RR: 18 (16 Sep 2019 05:58) (18 - 18)  SpO2: 99% (16 Sep 2019 05:58) (98% - 99%)      09-14 @ 07:01  -  09-15 @ 07:00  --------------------------------------------------------  IN: 500 mL / OUT: 0 mL / NET: 500 mL    09-15 @ 07:01  -  09-16 @ 06:44  --------------------------------------------------------  IN: 1345 mL / OUT: 0 mL / NET: 1345 mL      CAPILLARY BLOOD GLUCOSE      POCT Blood Glucose.: 236 mg/dL (16 Sep 2019 06:00)  POCT Blood Glucose.: 282 mg/dL (15 Sep 2019 23:05)  POCT Blood Glucose.: 280 mg/dL (15 Sep 2019 17:55)  POCT Blood Glucose.: 289 mg/dL (15 Sep 2019 12:43)    I&O's Summary    14 Sep 2019 07:01  -  15 Sep 2019 07:00  --------------------------------------------------------  IN: 500 mL / OUT: 0 mL / NET: 500 mL    15 Sep 2019 07:01  -  16 Sep 2019 06:44  --------------------------------------------------------  IN: 1345 mL / OUT: 0 mL / NET: 1345 mL        PHYSICAL EXAM:  GENERAL: NAD, well-developed, sleeping in bed  HEAD:  Atraumatic, Normocephalic  NECK: Supple, No JVD  CHEST/LUNG: Clear to auscultation bilaterally; No wheeze  HEART: Regular rate and rhythm; No murmurs, rubs, or gallops  ABDOMEN: Soft, Nontender, Nondistended; Bowel sounds present  EXTREMITIES:  No peripheral edema  PSYCH: AAOx0    LABS:                        7.7    7.14  )-----------( 305      ( 15 Sep 2019 06:30 )             25.7     09-15    147<H>  |  111<H>  |  58<H>  ----------------------------<  377<H>  4.0   |  22  |  3.41<H>    Ca    8.3<L>      15 Sep 2019 06:30  Phos  2.7     09-15  Mg     2.2     09-15                          MICROBIOLOGY:     no new micro    RADIOLOGY & ADDITIONAL TESTS:  no new imaging    CONSULTS:  consult notes reviewed and discussed with respective teams

## 2019-09-17 LAB
ANION GAP SERPL CALC-SCNC: 9 MMO/L — SIGNIFICANT CHANGE UP (ref 7–14)
BUN SERPL-MCNC: 62 MG/DL — HIGH (ref 7–23)
CALCIUM SERPL-MCNC: 8.1 MG/DL — LOW (ref 8.4–10.5)
CHLORIDE SERPL-SCNC: 113 MMOL/L — HIGH (ref 98–107)
CO2 SERPL-SCNC: 22 MMOL/L — SIGNIFICANT CHANGE UP (ref 22–31)
CREAT SERPL-MCNC: 3.33 MG/DL — HIGH (ref 0.5–1.3)
GLUCOSE BLDC GLUCOMTR-MCNC: 103 MG/DL — HIGH (ref 70–99)
GLUCOSE BLDC GLUCOMTR-MCNC: 161 MG/DL — HIGH (ref 70–99)
GLUCOSE BLDC GLUCOMTR-MCNC: 191 MG/DL — HIGH (ref 70–99)
GLUCOSE BLDC GLUCOMTR-MCNC: 201 MG/DL — HIGH (ref 70–99)
GLUCOSE SERPL-MCNC: 214 MG/DL — HIGH (ref 70–99)
HCT VFR BLD CALC: 26.8 % — LOW (ref 39–50)
HGB BLD-MCNC: 8.1 G/DL — LOW (ref 13–17)
MAGNESIUM SERPL-MCNC: 2.1 MG/DL — SIGNIFICANT CHANGE UP (ref 1.6–2.6)
MCHC RBC-ENTMCNC: 26.9 PG — LOW (ref 27–34)
MCHC RBC-ENTMCNC: 30.2 % — LOW (ref 32–36)
MCV RBC AUTO: 89 FL — SIGNIFICANT CHANGE UP (ref 80–100)
NRBC # FLD: 0.03 K/UL — SIGNIFICANT CHANGE UP (ref 0–0)
PHOSPHATE SERPL-MCNC: 2.3 MG/DL — LOW (ref 2.5–4.5)
PLATELET # BLD AUTO: 305 K/UL — SIGNIFICANT CHANGE UP (ref 150–400)
PMV BLD: 10.9 FL — SIGNIFICANT CHANGE UP (ref 7–13)
POTASSIUM SERPL-MCNC: 4.8 MMOL/L — SIGNIFICANT CHANGE UP (ref 3.5–5.3)
POTASSIUM SERPL-SCNC: 4.8 MMOL/L — SIGNIFICANT CHANGE UP (ref 3.5–5.3)
RBC # BLD: 3.01 M/UL — LOW (ref 4.2–5.8)
RBC # FLD: 14 % — SIGNIFICANT CHANGE UP (ref 10.3–14.5)
SODIUM SERPL-SCNC: 144 MMOL/L — SIGNIFICANT CHANGE UP (ref 135–145)
WBC # BLD: 8.66 K/UL — SIGNIFICANT CHANGE UP (ref 3.8–10.5)
WBC # FLD AUTO: 8.66 K/UL — SIGNIFICANT CHANGE UP (ref 3.8–10.5)

## 2019-09-17 PROCEDURE — 99232 SBSQ HOSP IP/OBS MODERATE 35: CPT | Mod: GC

## 2019-09-17 RX ORDER — ACETAMINOPHEN 500 MG
650 TABLET ORAL EVERY 6 HOURS
Refills: 0 | Status: DISCONTINUED | OUTPATIENT
Start: 2019-09-17 | End: 2019-09-20

## 2019-09-17 RX ADMIN — HEPARIN SODIUM 5000 UNIT(S): 5000 INJECTION INTRAVENOUS; SUBCUTANEOUS at 05:27

## 2019-09-17 RX ADMIN — Medication 3 MILLILITER(S): at 11:30

## 2019-09-17 RX ADMIN — Medication 3 MILLILITER(S): at 22:03

## 2019-09-17 RX ADMIN — POLYETHYLENE GLYCOL 3350 17 GRAM(S): 17 POWDER, FOR SOLUTION ORAL at 18:44

## 2019-09-17 RX ADMIN — HEPARIN SODIUM 5000 UNIT(S): 5000 INJECTION INTRAVENOUS; SUBCUTANEOUS at 22:26

## 2019-09-17 RX ADMIN — POLYETHYLENE GLYCOL 3350 17 GRAM(S): 17 POWDER, FOR SOLUTION ORAL at 05:30

## 2019-09-17 RX ADMIN — Medication 1: at 18:42

## 2019-09-17 RX ADMIN — Medication 50 MILLIGRAM(S): at 18:45

## 2019-09-17 RX ADMIN — Medication 650 MILLIGRAM(S): at 12:07

## 2019-09-17 RX ADMIN — Medication 2: at 05:33

## 2019-09-17 RX ADMIN — HUMAN INSULIN 7 UNIT(S): 100 INJECTION, SUSPENSION SUBCUTANEOUS at 12:40

## 2019-09-17 RX ADMIN — HEPARIN SODIUM 5000 UNIT(S): 5000 INJECTION INTRAVENOUS; SUBCUTANEOUS at 12:40

## 2019-09-17 RX ADMIN — PANTOPRAZOLE SODIUM 40 MILLIGRAM(S): 20 TABLET, DELAYED RELEASE ORAL at 11:37

## 2019-09-17 RX ADMIN — PIPERACILLIN AND TAZOBACTAM 25 GRAM(S): 4; .5 INJECTION, POWDER, LYOPHILIZED, FOR SOLUTION INTRAVENOUS at 05:26

## 2019-09-17 RX ADMIN — HUMAN INSULIN 7 UNIT(S): 100 INJECTION, SUSPENSION SUBCUTANEOUS at 05:33

## 2019-09-17 RX ADMIN — Medication 1: at 23:36

## 2019-09-17 RX ADMIN — Medication 50 MILLIGRAM(S): at 05:26

## 2019-09-17 RX ADMIN — PIPERACILLIN AND TAZOBACTAM 25 GRAM(S): 4; .5 INJECTION, POWDER, LYOPHILIZED, FOR SOLUTION INTRAVENOUS at 18:38

## 2019-09-17 RX ADMIN — Medication 3 MILLILITER(S): at 03:05

## 2019-09-17 RX ADMIN — HUMAN INSULIN 7 UNIT(S): 100 INJECTION, SUSPENSION SUBCUTANEOUS at 18:39

## 2019-09-17 RX ADMIN — LACTULOSE 20 GRAM(S): 10 SOLUTION ORAL at 11:37

## 2019-09-17 RX ADMIN — HUMAN INSULIN 7 UNIT(S): 100 INJECTION, SUSPENSION SUBCUTANEOUS at 23:36

## 2019-09-17 RX ADMIN — Medication 650 MILLIGRAM(S): at 11:37

## 2019-09-17 RX ADMIN — Medication 3 MILLILITER(S): at 16:22

## 2019-09-17 NOTE — PROVIDER CONTACT NOTE (OTHER) - RECOMMENDATIONS
Will continue to monitor. Suction provided Prn. MD to assess patient. Pain medication recommended to be added prn. Tube feedings to be held for now.

## 2019-09-17 NOTE — PROGRESS NOTE ADULT - ATTENDING COMMENTS
79M hx of alzheimer's dementia, HTN, PVD, CKD stage III, T2DM, blindness p/w 3 days of metabolic encephalopathy and respiratory distress and increased secretions, likely aspiration PNA.    #Metabolic Encephalopathy   - Possible CVA event: CT head unremarkable, but at this time patient will not be able to tolerate MRI due to copious secretions and tenuous respiratory status. Unable to get CTA Head/Neck due to elevated Cr.   - Patient afebrile, UA negative, CXR negative. Will c/w Zosyn x7 days for now given high likelihood of aspiration x 7 days    - Patient without significant improvement in mental status, this may likely be new baseline.     #Dysphagia/Aspiration   - NPO, aspiration precautions.   - ENT recs appreciated, s/p Decadron 8mg q8h x3 doses for possible edema   - S/S re-eval 9/13: NPO recommended. NG placed for feeding. Will c/w GOC discussions (re: PEG) with family. Consider Palliative care consult.   - C/w Zosyn x7 days total given high concern for aspiration event   - Hiccuping - some relief with reglan    #CKD likely stage 4  - Baseline Cr 3.3 in Aug   - Cr stabilizing    #GOC - patient is full code at this time per discussion with granddaughter.

## 2019-09-17 NOTE — PROGRESS NOTE ADULT - PROBLEM SELECTOR PLAN 3
Cr 3.8 on admission. Unknown baseline. Pt has hx of CKD stage 3. Downtrending.  - at discharge from [facility] one month ago, Cr was 3.3  - monitor Cr    #Hypernatremia  - titrate free water 300ml q6  - continue to monitor

## 2019-09-17 NOTE — PROGRESS NOTE ADULT - PROBLEM SELECTOR PLAN 2
hx of dysphagia, on pureed diet at home. drooling on presentation, seems unable to swallow secretions. Improving mental status  - concern for inability to protect airway; MICU evaluated pt, not MICU candidate; ENT scoped, found laryngeal edema, pooling secretions at glottic opening  - as per ENT, decadron and PPI  - speech and swallow recommend pt remain NPO for now, aspiration precautions  - zofran PRN for nausea  - GOC discussion with family about PEG today; consider consulting palliative    #Hiccups  persistent hiccups; improving, now intermittent  - trial of reglan 5mg q8 x 3 doses, renally dosed

## 2019-09-17 NOTE — PROGRESS NOTE ADULT - SUBJECTIVE AND OBJECTIVE BOX
Tawanda Mcdaniels, PGY-1  Internal Medicine  Pager [phone]    OVERNIGHT / SUBJECTIVE EVENTS:  -no acute events overnight  -Pt was seen and examined at bedside. Pt had no complaints. Denied f/c/n/v, CP, SOB, changes in vision, abdominal pain, dysuria, constipation, and diarrhea.    MEDICATIONS  (STANDING):  ALBUTerol/ipratropium for Nebulization 3 milliLiter(s) Nebulizer every 6 hours  dextrose 5%. 1000 milliLiter(s) (50 mL/Hr) IV Continuous <Continuous>  dextrose 50% Injectable 12.5 Gram(s) IV Push once  dextrose 50% Injectable 25 Gram(s) IV Push once  dextrose 50% Injectable 25 Gram(s) IV Push once  docusate sodium Liquid 50 milliGRAM(s) Oral two times a day  heparin  Injectable 5000 Unit(s) SubCutaneous every 8 hours  insulin lispro (HumaLOG) corrective regimen sliding scale   SubCutaneous every 6 hours  insulin NPH human recombinant 7 Unit(s) SubCutaneous every 6 hours  lactulose Syrup 20 Gram(s) Oral daily  pantoprazole  Injectable 40 milliGRAM(s) IV Push daily  piperacillin/tazobactam IVPB.. 3.375 Gram(s) IV Intermittent every 12 hours  polyethylene glycol 3350 17 Gram(s) Oral two times a day    MEDICATIONS  (PRN):  dextrose 40% Gel 15 Gram(s) Oral once PRN Blood Glucose LESS THAN 70 milliGRAM(s)/deciliter  glucagon  Injectable 1 milliGRAM(s) IntraMuscular once PRN Glucose LESS THAN 70 milligrams/deciliter  ondansetron Injectable 4 milliGRAM(s) IV Push every 6 hours PRN Nausea and/or Vomiting      Allergies    latex (Unknown)  No Known Drug Allergies    Intolerances        OBJECTIVE:  Vital Signs Last 24 Hrs  T(C): 36.9 (17 Sep 2019 05:21), Max: 37.4 (16 Sep 2019 22:05)  T(F): 98.4 (17 Sep 2019 05:21), Max: 99.4 (16 Sep 2019 22:05)  HR: 103 (17 Sep 2019 05:21) (85 - 103)  BP: 139/64 (17 Sep 2019 05:21) (139/64 - 157/75)  BP(mean): --  RR: 18 (17 Sep 2019 05:21) (17 - 18)  SpO2: 100% (17 Sep 2019 05:21) (97% - 100%)      09-16 @ 07:01  -  09-17 @ 07:00  --------------------------------------------------------  IN: 1495 mL / OUT: 400 mL / NET: 1095 mL      CAPILLARY BLOOD GLUCOSE      POCT Blood Glucose.: 201 mg/dL (17 Sep 2019 05:19)  POCT Blood Glucose.: 292 mg/dL (16 Sep 2019 22:16)  POCT Blood Glucose.: 235 mg/dL (16 Sep 2019 18:03)  POCT Blood Glucose.: 223 mg/dL (16 Sep 2019 12:23)    I&O's Summary    16 Sep 2019 07:01  -  17 Sep 2019 07:00  --------------------------------------------------------  IN: 1495 mL / OUT: 400 mL / NET: 1095 mL        PHYSICAL EXAM:  GENERAL: NAD, well-developed  HEAD:  Atraumatic, Normocephalic  EYES: EOMI, PERRLA, conjunctiva and sclera clear  NECK: Supple, No JVD  CHEST/LUNG: Clear to auscultation bilaterally; No wheeze  HEART: Regular rate and rhythm; No murmurs, rubs, or gallops  ABDOMEN: Soft, Nontender, Nondistended; Bowel sounds present  EXTREMITIES:  2+ Peripheral Pulses, No clubbing, cyanosis, or edema  PSYCH: AAOx3  SKIN: No rashes or lesions    LABS:                        7.9    6.85  )-----------( 298      ( 16 Sep 2019 06:40 )             25.9     09-16    147<H>  |  114<H>  |  57<H>  ----------------------------<  239<H>  4.4   |  22  |  3.46<H>    Ca    8.3<L>      16 Sep 2019 06:40  Phos  2.7     09-16  Mg     2.2     09-16                          MICROBIOLOGY:     no new micro    RADIOLOGY & ADDITIONAL TESTS:  no new imaging    CONSULTS:  consult notes reviewed and discussed with respective teams Tawanda Mcdaniels, PGY-1  Internal Medicine  Pager [phone]    OVERNIGHT / SUBJECTIVE EVENTS:  -no acute events overnight  -Pt was seen and examined at bedside. Pt not having hiccups; expresses discomfort, but cannot clarify why or where. A few moments later, pt was coughing; tube feeds were stopped, pt was suctioned and pt stopped coughing.    MEDICATIONS  (STANDING):  ALBUTerol/ipratropium for Nebulization 3 milliLiter(s) Nebulizer every 6 hours  dextrose 5%. 1000 milliLiter(s) (50 mL/Hr) IV Continuous <Continuous>  dextrose 50% Injectable 12.5 Gram(s) IV Push once  dextrose 50% Injectable 25 Gram(s) IV Push once  dextrose 50% Injectable 25 Gram(s) IV Push once  docusate sodium Liquid 50 milliGRAM(s) Oral two times a day  heparin  Injectable 5000 Unit(s) SubCutaneous every 8 hours  insulin lispro (HumaLOG) corrective regimen sliding scale   SubCutaneous every 6 hours  insulin NPH human recombinant 7 Unit(s) SubCutaneous every 6 hours  lactulose Syrup 20 Gram(s) Oral daily  pantoprazole  Injectable 40 milliGRAM(s) IV Push daily  piperacillin/tazobactam IVPB.. 3.375 Gram(s) IV Intermittent every 12 hours  polyethylene glycol 3350 17 Gram(s) Oral two times a day    MEDICATIONS  (PRN):  dextrose 40% Gel 15 Gram(s) Oral once PRN Blood Glucose LESS THAN 70 milliGRAM(s)/deciliter  glucagon  Injectable 1 milliGRAM(s) IntraMuscular once PRN Glucose LESS THAN 70 milligrams/deciliter  ondansetron Injectable 4 milliGRAM(s) IV Push every 6 hours PRN Nausea and/or Vomiting      Allergies    latex (Unknown)  No Known Drug Allergies    Intolerances        OBJECTIVE:  Vital Signs Last 24 Hrs  T(C): 36.9 (17 Sep 2019 05:21), Max: 37.4 (16 Sep 2019 22:05)  T(F): 98.4 (17 Sep 2019 05:21), Max: 99.4 (16 Sep 2019 22:05)  HR: 103 (17 Sep 2019 05:21) (85 - 103)  BP: 139/64 (17 Sep 2019 05:21) (139/64 - 157/75)  BP(mean): --  RR: 18 (17 Sep 2019 05:21) (17 - 18)  SpO2: 100% (17 Sep 2019 05:21) (97% - 100%)      09-16 @ 07:01  -  09-17 @ 07:00  --------------------------------------------------------  IN: 1495 mL / OUT: 400 mL / NET: 1095 mL      CAPILLARY BLOOD GLUCOSE      POCT Blood Glucose.: 201 mg/dL (17 Sep 2019 05:19)  POCT Blood Glucose.: 292 mg/dL (16 Sep 2019 22:16)  POCT Blood Glucose.: 235 mg/dL (16 Sep 2019 18:03)  POCT Blood Glucose.: 223 mg/dL (16 Sep 2019 12:23)    I&O's Summary    16 Sep 2019 07:01  -  17 Sep 2019 07:00  --------------------------------------------------------  IN: 1495 mL / OUT: 400 mL / NET: 1095 mL        PHYSICAL EXAM:  GENERAL: NAD, well-developed  HEAD:  Atraumatic, Normocephalic  EYES: EOMI, and sclera clear  NECK: Supple, No JVD  CHEST/LUNG: Clear to auscultation bilaterally; No wheeze or crackles  HEART: Regular rate and rhythm; No murmurs, rubs, or gallops  ABDOMEN: Soft, Nontender, Nondistended; Bowel sounds present  EXTREMITIES:  No peripheral edema  PSYCH: AAOx0      LABS:                        7.9    6.85  )-----------( 298      ( 16 Sep 2019 06:40 )             25.9     09-16    147<H>  |  114<H>  |  57<H>  ----------------------------<  239<H>  4.4   |  22  |  3.46<H>    Ca    8.3<L>      16 Sep 2019 06:40  Phos  2.7     09-16  Mg     2.2     09-16                          MICROBIOLOGY:     no new micro    RADIOLOGY & ADDITIONAL TESTS:  no new imaging    CONSULTS:  consult notes reviewed and discussed with respective teams

## 2019-09-17 NOTE — PROGRESS NOTE ADULT - PROBLEM SELECTOR PLAN 1
Likely 2/2 aspiration PNA vs. pneumonitis vs CVA vs. cardiac etiology. HD stable, O2 sating well. Clinically improve with suctioning.    aspiration PNA  - on zosyn day 7 of 7, renally dosed. CXR clear, but given pt's hx, high risk for aspiration. HD stable, O2 sating well, clinically improving with suctioning. Chest PT    change in mental status concerning for CNS process  -CT head neg. Pt will not tolerate MRI. Out of window time period for TPA. Hba1c 7.7, lipid panel unremarkable. ECHO ordered. On statin, plavix, ARB already at home; currently held for dysphagia.    unlikely cardiac etiology  -although initial trop elevated at 131, repeat in 1.5 hrs is downtrend to 120. Likely elevated w/ pt's hx of CKD. EKG showing NSR and non specific t wave abnormalities; no ST elevations or depressions. BNP 1191; given pt's age, not highly concerning. CXR clear. However, must r/o CHF. ECHO ordered. s/p 1 dose of lasix 20mg IV.

## 2019-09-17 NOTE — PROGRESS NOTE ADULT - PROBLEM SELECTOR PLAN 9
Discussed with granddaughter about code status. Currently full code.  - pt's wife endorsed that pt would want to be DNR/DNI, but family wants pt to be full code. We had a discussion of the consequences of being intubated as well as chest compressions, including being unable to come off the machine or death. Family understood and wants pt full code.    Discuss plans for PEG with family

## 2019-09-17 NOTE — PROGRESS NOTE ADULT - PROBLEM SELECTOR PLAN 4
On Humulin 70/30 at home with 25 units in AM and 15 units in evening. HbA1c 7.7. -292  - pt now receiving NGT; increased to NPH 7 units q6

## 2019-09-18 DIAGNOSIS — G30.1 ALZHEIMER'S DISEASE WITH LATE ONSET: ICD-10-CM

## 2019-09-18 DIAGNOSIS — Z51.5 ENCOUNTER FOR PALLIATIVE CARE: ICD-10-CM

## 2019-09-18 LAB
ANION GAP SERPL CALC-SCNC: 12 MMO/L — SIGNIFICANT CHANGE UP (ref 7–14)
BUN SERPL-MCNC: 76 MG/DL — HIGH (ref 7–23)
CALCIUM SERPL-MCNC: 8.2 MG/DL — LOW (ref 8.4–10.5)
CHLORIDE SERPL-SCNC: 108 MMOL/L — HIGH (ref 98–107)
CO2 SERPL-SCNC: 23 MMOL/L — SIGNIFICANT CHANGE UP (ref 22–31)
CREAT SERPL-MCNC: 3.97 MG/DL — HIGH (ref 0.5–1.3)
GLUCOSE BLDC GLUCOMTR-MCNC: 137 MG/DL — HIGH (ref 70–99)
GLUCOSE BLDC GLUCOMTR-MCNC: 163 MG/DL — HIGH (ref 70–99)
GLUCOSE BLDC GLUCOMTR-MCNC: 225 MG/DL — HIGH (ref 70–99)
GLUCOSE BLDC GLUCOMTR-MCNC: 233 MG/DL — HIGH (ref 70–99)
GLUCOSE SERPL-MCNC: 157 MG/DL — HIGH (ref 70–99)
MAGNESIUM SERPL-MCNC: 2.2 MG/DL — SIGNIFICANT CHANGE UP (ref 1.6–2.6)
PHOSPHATE SERPL-MCNC: 2.9 MG/DL — SIGNIFICANT CHANGE UP (ref 2.5–4.5)
POTASSIUM SERPL-MCNC: 4.9 MMOL/L — SIGNIFICANT CHANGE UP (ref 3.5–5.3)
POTASSIUM SERPL-SCNC: 4.9 MMOL/L — SIGNIFICANT CHANGE UP (ref 3.5–5.3)
SODIUM SERPL-SCNC: 143 MMOL/L — SIGNIFICANT CHANGE UP (ref 135–145)

## 2019-09-18 PROCEDURE — 99223 1ST HOSP IP/OBS HIGH 75: CPT

## 2019-09-18 PROCEDURE — 99233 SBSQ HOSP IP/OBS HIGH 50: CPT | Mod: GC

## 2019-09-18 RX ORDER — SODIUM CHLORIDE 9 MG/ML
1000 INJECTION, SOLUTION INTRAVENOUS
Refills: 0 | Status: DISCONTINUED | OUTPATIENT
Start: 2019-09-18 | End: 2019-09-19

## 2019-09-18 RX ADMIN — LACTULOSE 20 GRAM(S): 10 SOLUTION ORAL at 13:02

## 2019-09-18 RX ADMIN — Medication 3 MILLILITER(S): at 22:19

## 2019-09-18 RX ADMIN — Medication 2: at 18:04

## 2019-09-18 RX ADMIN — Medication 3 MILLILITER(S): at 10:51

## 2019-09-18 RX ADMIN — Medication 3 MILLILITER(S): at 15:48

## 2019-09-18 RX ADMIN — HUMAN INSULIN 7 UNIT(S): 100 INJECTION, SUSPENSION SUBCUTANEOUS at 06:02

## 2019-09-18 RX ADMIN — HEPARIN SODIUM 5000 UNIT(S): 5000 INJECTION INTRAVENOUS; SUBCUTANEOUS at 13:02

## 2019-09-18 RX ADMIN — Medication 650 MILLIGRAM(S): at 18:46

## 2019-09-18 RX ADMIN — Medication 2: at 13:02

## 2019-09-18 RX ADMIN — Medication 650 MILLIGRAM(S): at 18:04

## 2019-09-18 RX ADMIN — HUMAN INSULIN 7 UNIT(S): 100 INJECTION, SUSPENSION SUBCUTANEOUS at 13:01

## 2019-09-18 RX ADMIN — SODIUM CHLORIDE 50 MILLILITER(S): 9 INJECTION, SOLUTION INTRAVENOUS at 10:42

## 2019-09-18 RX ADMIN — POLYETHYLENE GLYCOL 3350 17 GRAM(S): 17 POWDER, FOR SOLUTION ORAL at 05:45

## 2019-09-18 RX ADMIN — Medication 3 MILLILITER(S): at 04:48

## 2019-09-18 RX ADMIN — PIPERACILLIN AND TAZOBACTAM 25 GRAM(S): 4; .5 INJECTION, POWDER, LYOPHILIZED, FOR SOLUTION INTRAVENOUS at 05:54

## 2019-09-18 RX ADMIN — HEPARIN SODIUM 5000 UNIT(S): 5000 INJECTION INTRAVENOUS; SUBCUTANEOUS at 05:46

## 2019-09-18 RX ADMIN — PANTOPRAZOLE SODIUM 40 MILLIGRAM(S): 20 TABLET, DELAYED RELEASE ORAL at 13:02

## 2019-09-18 RX ADMIN — HEPARIN SODIUM 5000 UNIT(S): 5000 INJECTION INTRAVENOUS; SUBCUTANEOUS at 22:28

## 2019-09-18 RX ADMIN — Medication 1: at 06:03

## 2019-09-18 RX ADMIN — Medication 50 MILLIGRAM(S): at 05:46

## 2019-09-18 RX ADMIN — HUMAN INSULIN 7 UNIT(S): 100 INJECTION, SUSPENSION SUBCUTANEOUS at 18:04

## 2019-09-18 NOTE — CONSULT NOTE ADULT - PROBLEM SELECTOR RECOMMENDATION 4
Discussed advanced directives, granddaughter understands the risk vs benefit of aggressive interventions in the setting of advanced dementia, will discuss with all her family and will get back to us. They would not want PEG, in agreement for removal of NG tube, will continue with dysphagia diet and comfort feeds. See GOC note.

## 2019-09-18 NOTE — CONSULT NOTE ADULT - PROBLEM SELECTOR RECOMMENDATION 9
+Dysphagia, likely 2/2 dementia. After GOC conversation with family, no PEG being pursued.  In the setting of dementia PEG/artificial nutrition not recommended, as it does not improve qol or prognosis. See goc note.

## 2019-09-18 NOTE — PROGRESS NOTE ADULT - PROBLEM SELECTOR PLAN 9
Discussed with granddaughter about code status. Currently full code.  - pt's wife endorsed that pt would want to be DNR/DNI, but family wants pt to be full code. We had a discussion of the consequences of being intubated as well as chest compressions, including being unable to come off the machine or death. Family understood and wants pt full code.    Family does not want PEG. Palliative consult today. Discussed with granddaughter about code status. Currently full code.  - pt's wife endorsed that pt would want to be DNR/DNI, but family wants pt to be full code. We had a discussion of the consequences of being intubated as well as chest compressions, including being unable to come off the machine or death. Family understood and wants pt full code.    Family does not want PEG. Palliative to reach out to family today.

## 2019-09-18 NOTE — PROGRESS NOTE ADULT - PROBLEM SELECTOR PLAN 1
Likely 2/2 aspiration PNA vs. pneumonitis vs CVA vs. cardiac etiology. HD stable, O2 sating well. Clinically improve with suctioning.    aspiration PNA  - finished 7 day course of zosyn, renally dosed. CXR clear, but given pt's hx, high risk for aspiration. HD stable, O2 sating well, clinically improving with suctioning. Chest PT    change in mental status concerning for CNS process  -CT head neg. Pt will not tolerate MRI. Out of window time period for TPA. Hba1c 7.7, lipid panel unremarkable. ECHO showing grossly normal LVSF, unable to evaluate valvular function. On statin, plavix, ARB already at home; currently held for dysphagia.    unlikely cardiac etiology  -although initial trop elevated at 131, repeat in 1.5 hrs is downtrend to 120. Likely elevated w/ pt's hx of CKD. EKG showing NSR and non specific t wave abnormalities; no ST elevations or depressions. BNP 1191; given pt's age, not highly concerning. CXR clear. However, must r/o CHF. ECHO ordered. s/p 1 dose of lasix 20mg IV.

## 2019-09-18 NOTE — CONSULT NOTE ADULT - ASSESSMENT
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 Palliative Care consulted for complex decision making in the setting of advanced dementia.

## 2019-09-18 NOTE — PROGRESS NOTE ADULT - SUBJECTIVE AND OBJECTIVE BOX
Tawanda Mcdaniels, PGY-1  Internal Medicine  Pager [phone]    OVERNIGHT / SUBJECTIVE EVENTS:  -no acute events overnight  -Pt was seen and examined at bedside. Pt had no complaints. Denied f/c/n/v, CP, SOB, changes in vision, abdominal pain, dysuria, constipation, and diarrhea.    MEDICATIONS  (STANDING):  ALBUTerol/ipratropium for Nebulization 3 milliLiter(s) Nebulizer every 6 hours  dextrose 5%. 1000 milliLiter(s) (50 mL/Hr) IV Continuous <Continuous>  dextrose 50% Injectable 12.5 Gram(s) IV Push once  dextrose 50% Injectable 25 Gram(s) IV Push once  dextrose 50% Injectable 25 Gram(s) IV Push once  docusate sodium Liquid 50 milliGRAM(s) Oral two times a day  heparin  Injectable 5000 Unit(s) SubCutaneous every 8 hours  insulin lispro (HumaLOG) corrective regimen sliding scale   SubCutaneous every 6 hours  insulin NPH human recombinant 7 Unit(s) SubCutaneous every 6 hours  lactulose Syrup 20 Gram(s) Oral daily  pantoprazole  Injectable 40 milliGRAM(s) IV Push daily  polyethylene glycol 3350 17 Gram(s) Oral two times a day    MEDICATIONS  (PRN):  acetaminophen    Suspension .. 650 milliGRAM(s) Oral every 6 hours PRN Mild Pain (1 - 3), Moderate Pain (4 - 6)  dextrose 40% Gel 15 Gram(s) Oral once PRN Blood Glucose LESS THAN 70 milliGRAM(s)/deciliter  glucagon  Injectable 1 milliGRAM(s) IntraMuscular once PRN Glucose LESS THAN 70 milligrams/deciliter  ondansetron Injectable 4 milliGRAM(s) IV Push every 6 hours PRN Nausea and/or Vomiting      Allergies    latex (Unknown)  No Known Drug Allergies    Intolerances        OBJECTIVE:  Vital Signs Last 24 Hrs  T(C): 37.1 (18 Sep 2019 05:28), Max: 37.1 (18 Sep 2019 05:28)  T(F): 98.7 (18 Sep 2019 05:28), Max: 98.7 (18 Sep 2019 05:28)  HR: 96 (18 Sep 2019 05:28) (68 - 102)  BP: 150/62 (18 Sep 2019 05:28) (97/55 - 150/62)  BP(mean): --  RR: 16 (18 Sep 2019 05:28) (16 - 20)  SpO2: 100% (18 Sep 2019 05:28) (95% - 100%)      09-17 @ 07:01  -  09-18 @ 07:00  --------------------------------------------------------  IN: 2245 mL / OUT: 310 mL / NET: 1935 mL      CAPILLARY BLOOD GLUCOSE      POCT Blood Glucose.: 163 mg/dL (18 Sep 2019 06:00)  POCT Blood Glucose.: 191 mg/dL (17 Sep 2019 23:18)  POCT Blood Glucose.: 161 mg/dL (17 Sep 2019 18:10)  POCT Blood Glucose.: 103 mg/dL (17 Sep 2019 12:35)    I&O's Summary    17 Sep 2019 07:01  -  18 Sep 2019 07:00  --------------------------------------------------------  IN: 2245 mL / OUT: 310 mL / NET: 1935 mL        PHYSICAL EXAM:  GENERAL: NAD, well-developed  HEAD:  Atraumatic, Normocephalic  EYES: EOMI, PERRLA, conjunctiva and sclera clear  NECK: Supple, No JVD  CHEST/LUNG: Clear to auscultation bilaterally; No wheeze  HEART: Regular rate and rhythm; No murmurs, rubs, or gallops  ABDOMEN: Soft, Nontender, Nondistended; Bowel sounds present  EXTREMITIES:  2+ Peripheral Pulses, No clubbing, cyanosis, or edema  PSYCH: AAOx3  SKIN: No rashes or lesions    LABS:                        8.1    8.66  )-----------( 305      ( 17 Sep 2019 06:15 )             26.8     09-17    144  |  113<H>  |  62<H>  ----------------------------<  214<H>  4.8   |  22  |  3.33<H>    Ca    8.1<L>      17 Sep 2019 06:15  Phos  2.3     09-17  Mg     2.1     09-17      MICROBIOLOGY:     no new micro    RADIOLOGY & ADDITIONAL TESTS:  no new imaging    CONSULTS:  consult notes reviewed and discussed with respective teams Tawanda Mcdaniels, PGY-1  Internal Medicine  Pager [phone]    OVERNIGHT / SUBJECTIVE EVENTS:  -no acute events overnight  -Pt was seen and examined at bedside. Pt had BM last night. Pt AAOX0, unchanged from yesterday. However, pt able to say "no" when asked if he had pain or discomfort anywhere.    MEDICATIONS  (STANDING):  ALBUTerol/ipratropium for Nebulization 3 milliLiter(s) Nebulizer every 6 hours  dextrose 5%. 1000 milliLiter(s) (50 mL/Hr) IV Continuous <Continuous>  dextrose 50% Injectable 12.5 Gram(s) IV Push once  dextrose 50% Injectable 25 Gram(s) IV Push once  dextrose 50% Injectable 25 Gram(s) IV Push once  docusate sodium Liquid 50 milliGRAM(s) Oral two times a day  heparin  Injectable 5000 Unit(s) SubCutaneous every 8 hours  insulin lispro (HumaLOG) corrective regimen sliding scale   SubCutaneous every 6 hours  insulin NPH human recombinant 7 Unit(s) SubCutaneous every 6 hours  lactulose Syrup 20 Gram(s) Oral daily  pantoprazole  Injectable 40 milliGRAM(s) IV Push daily  polyethylene glycol 3350 17 Gram(s) Oral two times a day    MEDICATIONS  (PRN):  acetaminophen    Suspension .. 650 milliGRAM(s) Oral every 6 hours PRN Mild Pain (1 - 3), Moderate Pain (4 - 6)  dextrose 40% Gel 15 Gram(s) Oral once PRN Blood Glucose LESS THAN 70 milliGRAM(s)/deciliter  glucagon  Injectable 1 milliGRAM(s) IntraMuscular once PRN Glucose LESS THAN 70 milligrams/deciliter  ondansetron Injectable 4 milliGRAM(s) IV Push every 6 hours PRN Nausea and/or Vomiting      Allergies    latex (Unknown)  No Known Drug Allergies    Intolerances        OBJECTIVE:  Vital Signs Last 24 Hrs  T(C): 37.1 (18 Sep 2019 05:28), Max: 37.1 (18 Sep 2019 05:28)  T(F): 98.7 (18 Sep 2019 05:28), Max: 98.7 (18 Sep 2019 05:28)  HR: 96 (18 Sep 2019 05:28) (68 - 102)  BP: 150/62 (18 Sep 2019 05:28) (97/55 - 150/62)  BP(mean): --  RR: 16 (18 Sep 2019 05:28) (16 - 20)  SpO2: 100% (18 Sep 2019 05:28) (95% - 100%)      09-17 @ 07:01  -  09-18 @ 07:00  --------------------------------------------------------  IN: 2245 mL / OUT: 310 mL / NET: 1935 mL      CAPILLARY BLOOD GLUCOSE      POCT Blood Glucose.: 163 mg/dL (18 Sep 2019 06:00)  POCT Blood Glucose.: 191 mg/dL (17 Sep 2019 23:18)  POCT Blood Glucose.: 161 mg/dL (17 Sep 2019 18:10)  POCT Blood Glucose.: 103 mg/dL (17 Sep 2019 12:35)    I&O's Summary    17 Sep 2019 07:01  -  18 Sep 2019 07:00  --------------------------------------------------------  IN: 2245 mL / OUT: 310 mL / NET: 1935 mL        PHYSICAL EXAM:  GENERAL: NAD, well-developed  HEAD:  Atraumatic, Normocephalic  CHEST/LUNG: Clear to auscultation bilaterally; No wheeze  HEART: Regular rate and rhythm; No murmurs, rubs, or gallops  ABDOMEN: Soft, Nontender, Nondistended  EXTREMITIES:  No peripheral edema  PSYCH: AAOx0    LABS:                        8.1    8.66  )-----------( 305      ( 17 Sep 2019 06:15 )             26.8     09-17    144  |  113<H>  |  62<H>  ----------------------------<  214<H>  4.8   |  22  |  3.33<H>    Ca    8.1<L>      17 Sep 2019 06:15  Phos  2.3     09-17  Mg     2.1     09-17      MICROBIOLOGY:     no new micro    RADIOLOGY & ADDITIONAL TESTS:  no new imaging    CONSULTS:  consult notes reviewed and discussed with respective teams

## 2019-09-18 NOTE — PROGRESS NOTE ADULT - PROBLEM SELECTOR PLAN 2
hx of dysphagia, on pureed diet at home. drooling on presentation, seems unable to swallow secretions. Improving mental status  - concern for inability to protect airway; MICU evaluated pt, not MICU candidate; ENT scoped, found laryngeal edema, pooling secretions at glottic opening  - as per ENT, decadron and PPI  - speech and swallow recommend pt remain NPO for now, aspiration precautions  - zofran PRN for nausea  - Family does not want PEG; palliative consult today     #Hiccups  persistent hiccups; improving, now intermittent  - trial of reglan 5mg q8 x 3 doses, renally dosed hx of dysphagia, on pureed diet at home. drooling on presentation, seems unable to swallow secretions. Improving mental status  - concern for inability to protect airway; MICU evaluated pt, not MICU candidate; ENT scoped, found laryngeal edema, pooling secretions at glottic opening  - as per ENT, decadron and PPI  - speech and swallow recommend pt remain NPO for now, aspiration precautions  - zofran PRN for nausea  - Family does not want PEG; palliative to reach out to family today    #Hiccups  persistent hiccups; improving, now intermittent  - trial of reglan 5mg q8 x 3 doses, renally dosed

## 2019-09-18 NOTE — PROGRESS NOTE ADULT - PROBLEM SELECTOR PLAN 4
On Humulin 70/30 at home with 25 units in AM and 15 units in evening. HbA1c 7.7. -191  - pt now receiving NGT; c/w NPH 7 units q6

## 2019-09-18 NOTE — GOALS OF CARE CONVERSATION - PERSONAL ADVANCE DIRECTIVE - CONVERSATION DETAILS
Referral to palliative care for goals of care in the setting of advanced illness. Spoke to pt's granddaughter via telephone. Pt's spouse defers medical making to her granddaughter. Discussed pt's overall medical condition including a discussion regarding trajectory of disease, prognosis, and transition of care options. Pt's granddaughter appears to have a good understanding about her grandfather's medical condition and understands that his prognosis is poor and understands that focusing on his comfort and quality if life is most important. Family are aware of pt's risk of aspiration and are in agreement that pt would not benefit from artificial nutrition as his overall quality of life would likely not improve. Extensive discussion and education on comfort/pleasure feeds. Granddaughter in agreement that pt would benefit from pleasure/comfort feeds and understands that the NG tube will be removed. Discussed role and philosophy of hospice care and recommended hospice care for pt. Granddaughter in agreement; referral made to hospice care network. Pt resides at home with 24 hour care and has good family support.   Discussion regarding advanced care planning and preferences for end of life care. Discussed risk and benefit of resuscitative measures at the end of life. Granddaughter does not feel pt would not benefit from aggressive resuscitative measures at the end of life such as CPR or mechanical ventilation however states that she needs to further discuss with pt's spouse prior to completing MOLST. At this time pt does remain full code. On going discussion with family regarding advance care planning. Referral to palliative care for goals of care in the setting of advanced illness. Spoke to pt's granddaughter via telephone. Pt's spouse defers medical making to her granddaughter. Discussed pt's overall medical condition including a discussion regarding trajectory of disease, prognosis, and transition of care options. Pt's granddaughter appears to have a good understanding about her grandfather's medical condition and understands that his prognosis is poor.  She states that focusing on his comfort and quality if life is most important. Family are aware of pt's risk of aspiration and are in agreement that pt would not benefit from artificial nutrition as his overall quality of life or prognosis would likely not improve. Extensive discussion and education on comfort/pleasure feeds. Granddaughter in agreement that pt would benefit from pleasure/comfort feeds and understands that the NG tube will be removed. Discussed role and philosophy of hospice care and recommended hospice care for pt. Granddaughter in agreement; referral made to hospice care network. Pt resides at home with 24 hour care and has good family support.   Discussion regarding advanced care planning and preferences for end of life care. Discussed risk and benefit of resuscitative measures at the end of life including CPR and mechanical ventilation. Granddaughter does not feel pt would not benefit from aggressive resuscitative measures at the end of life such as CPR or mechanical ventilation however states that she needs to further discuss with pt's spouse and family prior to completing MOLST. At this time pt does remain full code. On going discussion with family regarding advance care planning.

## 2019-09-18 NOTE — PROGRESS NOTE ADULT - PROBLEM SELECTOR PLAN 3
Cr 3.8 on admission. Unknown baseline. Pt has hx of CKD stage 3. Stable.  - at discharge from [facility] one month ago, Cr was 3.3  - monitor Cr    #Hypernatremia  - titrate free water 300ml q6  - continue to monitor

## 2019-09-18 NOTE — CONSULT NOTE ADULT - SUBJECTIVE AND OBJECTIVE BOX
HPI:  79M hx of alzheimer's dementia, HTN, PVD, CKD stage III, DM2, HLD, BPH, blindness in b/l eyes, pw 3 days of AMS and respiratory distress. Hx obtained from granddaughter at bedside. Prior to symptoms, pt able to converse, move around with assistance. 2 weeks ago, pt started having "chest spasms", granddaughter describes as hiccups. 3 days ago, pt started to complain of throat pain and became more somnolent, not speaking, becoming more bed bound, making gurgling sounds with breathing. Endorsed that pt afebrile at home. Had home health PA come evaluate pt. Granddaughter was told that CXR was clear and that chest spasms were due to alzheimer's medications, as a result pt's alzheimer's medications were held. Family decided to bring pt in today b/c symptoms were not improving. As per granddaughter, pt did not c/o any CP, abdominal pain, dysuria, n/v/d, prior to symptom onset.    Of note, granddaughter endorses that prior to symptoms, pt had no issues with swallowing, however, pt would eat pureed soups. From medical records brought by granddaughter, pt was advised to have a dysphagia level 2: mechanically altered, thin diet. (11 Sep 2019 08:06)    PERTINENT PM/SXH:   HLD (hyperlipidemia)  Blindness  Chronic kidney disease (CKD)  Alzheimer disease  PVD (peripheral vascular disease)  BPH (benign prostatic hyperplasia)  DM (diabetes mellitus)  HTN (hypertension)    FAMILY HISTORY:    ITEMS NOT CHECKED ARE NOT PRESENT    SOCIAL HISTORY:   Significant other/partner:  [x ]  Children:  [x ]  Shinto/Spirituality:  Substance hx:  [ ]   Tobacco hx:  [ ]   Alcohol hx: [ ]   Home Opioid hx:  [ ] I-Stop Reference No:  Living Situation: [x ]Home with HHA [ ]Long term care  [ ]Rehab [ ]Other    ADVANCE DIRECTIVES:    DNR  MOLST  [ ]  Living Will  [ ]   DECISION MAKER(s):  [ ] Health Care Proxy(s)  [x ] Surrogate(s)  [ ] Guardian           Name(s): Phone Number(s):  Spouse deferring to granddaughter   Pauline Faith (granddaughter) #[phone]    BASELINE (I)ADL(s) (prior to admission):  [place]: [ ]Total  [ ] Moderate [x ]Dependent    Allergies    latex (Unknown)  No Known Drug Allergies    Intolerances    MEDICATIONS  (STANDING):  ALBUTerol/ipratropium for Nebulization 3 milliLiter(s) Nebulizer every 6 hours  dextrose 5%. 1000 milliLiter(s) (50 mL/Hr) IV Continuous <Continuous>  dextrose 50% Injectable 12.5 Gram(s) IV Push once  dextrose 50% Injectable 25 Gram(s) IV Push once  dextrose 50% Injectable 25 Gram(s) IV Push once  docusate sodium Liquid 50 milliGRAM(s) Oral two times a day  heparin  Injectable 5000 Unit(s) SubCutaneous every 8 hours  insulin lispro (HumaLOG) corrective regimen sliding scale   SubCutaneous every 6 hours  insulin NPH human recombinant 7 Unit(s) SubCutaneous every 6 hours  lactated ringers. 1000 milliLiter(s) (50 mL/Hr) IV Continuous <Continuous>  lactulose Syrup 20 Gram(s) Oral daily  pantoprazole  Injectable 40 milliGRAM(s) IV Push daily  polyethylene glycol 3350 17 Gram(s) Oral two times a day    MEDICATIONS  (PRN):  acetaminophen    Suspension .. 650 milliGRAM(s) Oral every 6 hours PRN Mild Pain (1 - 3), Moderate Pain (4 - 6)  dextrose 40% Gel 15 Gram(s) Oral once PRN Blood Glucose LESS THAN 70 milliGRAM(s)/deciliter  glucagon  Injectable 1 milliGRAM(s) IntraMuscular once PRN Glucose LESS THAN 70 milligrams/deciliter  ondansetron Injectable 4 milliGRAM(s) IV Push every 6 hours PRN Nausea and/or Vomiting    PRESENT SYMPTOMS: [ x]Unable to obtain due to poor mentation   Source if other than patient:  [ ]Family   [ ]Team     Pain (Impact on QOL):    Location -         Minimal acceptable level (0-10 scale):                    Aggravating factors -  Quality -  Radiation -  Severity (0-10 scale) -    Timing -    PAIN AD Score: 2    [email or web address] (press ctrl +  left click to view)    Dyspnea:                           [ ]Mild [ ]Moderate [ ]Severe  Anxiety:                             [ ]Mild [ ]Moderate [ ]Severe  Fatigue:                             [ ]Mild [ ]Moderate [ ]Severe  Nausea:                             [ ]Mild [ ]Moderate [ ]Severe  Loss of appetite:              [ ]Mild [ ]Moderate [ ]Severe  Constipation:                    [ ]Mild [ ]Moderate [ ]Severe    Other Symptoms:  [ ]All other review of systems negative     Karnofsky Performance Score/Palliative Performance Status Version 2: 20 %    http://palliative.info/resource_material/[email or web address]    PHYSICAL EXAM:  Vital Signs Last 24 Hrs  T(C): 37.1 (18 Sep 2019 05:28), Max: 37.1 (18 Sep 2019 05:28)  T(F): 98.7 (18 Sep 2019 05:28), Max: 98.7 (18 Sep 2019 05:28)  HR: 83 (18 Sep 2019 10:52) (68 - 102)  BP: 150/62 (18 Sep 2019 05:28) (97/55 - 150/62)  BP(mean): --  RR: 16 (18 Sep 2019 05:28) (16 - 20)  SpO2: 97% (18 Sep 2019 10:52) (96% - 100%) I&O's Summary    17 Sep 2019 07:01  -  18 Sep 2019 07:00  --------------------------------------------------------  IN: 2245 mL / OUT: 310 mL / NET: 1935 mL    GENERAL:  [ ]Alert  [ ]Oriented x   [ x]Lethargic  [ ]Cachexia  [ ]Unarousable  [ ]Verbal  [ ]Non-Verbal  Behavioral:   [ ] Anxiety  [x ] Delirium [ ] Agitation [ ] Other  HEENT:  [ ]Normal   [ x]Dry mouth   [ ]ET Tube/Trach  [ ]Oral lesions  PULMONARY:   [ ]Clear [ ]Tachypnea  [ x]Audible excessive secretions   [ ]Rhonchi        [ ]Right [ ]Left [ ]Bilateral  [ ]Crackles        [ ]Right [ ]Left [ ]Bilateral  [ ]Wheezing     [ ]Right [ ]Left [ ]Bilateral  CARDIOVASCULAR:    [x ]Regular [ ]Irregular [ ]Tachy  [ ][place] [ ]Murmur [ ]Other  GASTROINTESTINAL:  [x ]Soft  [ ]Distended   [x ]+BS  [ ]Non tender [ ]Tender  [ ]PEG [x ]OGT/ NGT  Last BM:   GENITOURINARY:  [ ]Normal [ x] Incontinent   [ ]Oliguria/Anuria   [ ][place]  MUSCULOSKELETAL:   [ ]Normal   [ ]Weakness  [ x]Bed/Wheelchair bound [ ]Edema  NEUROLOGIC:   [ ]No focal deficits  [x ] Cognitive impairment  [x ] Dysphagia [ ]Dysarthria [ ] Paresis [ ]Other   SKIN:   [ ]Normal   [ ]Pressure ulcer(s)  [ ]Rash    CRITICAL CARE:  [ ] Shock Present  [ ]Septic [ ]Cardiogenic [ ]Neurologic [ ]Hypovolemic  [ ]  Vasopressors [ ]  Inotropes   [ ] Respiratory failure present  [ ] Acute  [ ] Chronic [ ] Hypoxic  [ ] Hypercarbic [ ] Other  [ ] Other organ failure     GRIEF  [x ] Yes  [ ] No    LABS:                        8.1    8.66  )-----------( 305      ( 17 Sep 2019 06:15 )             26.8   09-18    143  |  108<H>  |  76<H>  ----------------------------<  157<H>  4.9   |  23  |  3.97<H>    Ca    8.2<L>      18 Sep 2019 06:39  Phos  2.9     09-18  Mg     2.2     09-18    RADIOLOGY & ADDITIONAL STUDIES: reviewed.     PROTEIN CALORIE MALNUTRITION PRESENT: [ ] Yes [ ] No  [x ] PPSV2 < or = to 30% [ ] significant weight loss  [ ] poor nutritional intake [ ] catabolic state [ ] anasarca     Albumin, Serum: 3.0 g/dL (09-11-19 @ 01:00)  Artificial Nutrition [ ]     REFERRALS:   [ ]Chaplaincy  [ ] Hospice  [ ]Child Life  [ ]Social Work  [ ]Case management [ ]Holistic Therapy   Goals of Care Discussion Document:

## 2019-09-18 NOTE — PROGRESS NOTE ADULT - ATTENDING COMMENTS
79M hx of alzheimer's dementia, HTN, PVD, CKD stage III, T2DM, blindness p/w 3 days of metabolic encephalopathy and respiratory distress and increased secretions, likely aspiration PNA.    #Metabolic Encephalopathy   - Possible CVA event: CT head unremarkable, but at this time patient will not be able to tolerate MRI due to copious secretions and tenuous respiratory status. Unable to get CTA Head/Neck due to elevated Cr.   - Completed 7 days Zosyn for likely aspiration event   - Patient without significant improvement in mental status, this may likely be new baseline. Extensive discussions with family and palliative care. Hospice referral made today.     #Dysphagia/Aspiration   - S/S re-eval 9/13: NPO recommended. NG placed for feeding.  - Per discussions with Palliative, family does not want to pursue PEG and would like pleasure feeds. Will d/c NGT.     #CKD likely stage 4  - Baseline Cr 3.3 in Aug     #GOC - Palliative recs appreciated. Family would like to focus more on comfort and understand that patient has poor prognosis. Hospice referral made today.

## 2019-09-19 ENCOUNTER — TRANSCRIPTION ENCOUNTER (OUTPATIENT)
Age: 79
End: 2019-09-19

## 2019-09-19 LAB
ANION GAP SERPL CALC-SCNC: 11 MMO/L — SIGNIFICANT CHANGE UP (ref 7–14)
BUN SERPL-MCNC: 74 MG/DL — HIGH (ref 7–23)
CALCIUM SERPL-MCNC: 8.1 MG/DL — LOW (ref 8.4–10.5)
CHLORIDE SERPL-SCNC: 109 MMOL/L — HIGH (ref 98–107)
CO2 SERPL-SCNC: 22 MMOL/L — SIGNIFICANT CHANGE UP (ref 22–31)
CREAT SERPL-MCNC: 3.87 MG/DL — HIGH (ref 0.5–1.3)
GLUCOSE BLDC GLUCOMTR-MCNC: 108 MG/DL — HIGH (ref 70–99)
GLUCOSE BLDC GLUCOMTR-MCNC: 117 MG/DL — HIGH (ref 70–99)
GLUCOSE BLDC GLUCOMTR-MCNC: 124 MG/DL — HIGH (ref 70–99)
GLUCOSE SERPL-MCNC: 97 MG/DL — SIGNIFICANT CHANGE UP (ref 70–99)
MAGNESIUM SERPL-MCNC: 2.2 MG/DL — SIGNIFICANT CHANGE UP (ref 1.6–2.6)
PHOSPHATE SERPL-MCNC: 3.2 MG/DL — SIGNIFICANT CHANGE UP (ref 2.5–4.5)
POTASSIUM SERPL-MCNC: 4.7 MMOL/L — SIGNIFICANT CHANGE UP (ref 3.5–5.3)
POTASSIUM SERPL-SCNC: 4.7 MMOL/L — SIGNIFICANT CHANGE UP (ref 3.5–5.3)
SODIUM SERPL-SCNC: 142 MMOL/L — SIGNIFICANT CHANGE UP (ref 135–145)

## 2019-09-19 PROCEDURE — 99232 SBSQ HOSP IP/OBS MODERATE 35: CPT

## 2019-09-19 PROCEDURE — 99232 SBSQ HOSP IP/OBS MODERATE 35: CPT | Mod: GC

## 2019-09-19 RX ORDER — ACETAMINOPHEN 500 MG
650 TABLET ORAL EVERY 6 HOURS
Refills: 0 | Status: DISCONTINUED | OUTPATIENT
Start: 2019-09-19 | End: 2019-09-20

## 2019-09-19 RX ORDER — ROBINUL 0.2 MG/ML
0.2 INJECTION INTRAMUSCULAR; INTRAVENOUS EVERY 6 HOURS
Refills: 0 | Status: DISCONTINUED | OUTPATIENT
Start: 2019-09-19 | End: 2019-09-19

## 2019-09-19 RX ORDER — SCOPALAMINE 1 MG/3D
1 PATCH, EXTENDED RELEASE TRANSDERMAL ONCE
Refills: 0 | Status: COMPLETED | OUTPATIENT
Start: 2019-09-19 | End: 2019-09-19

## 2019-09-19 RX ORDER — HYDROMORPHONE HYDROCHLORIDE 2 MG/ML
0.5 INJECTION INTRAMUSCULAR; INTRAVENOUS; SUBCUTANEOUS EVERY 6 HOURS
Refills: 0 | Status: DISCONTINUED | OUTPATIENT
Start: 2019-09-19 | End: 2019-09-20

## 2019-09-19 RX ADMIN — SCOPALAMINE 1 PATCH: 1 PATCH, EXTENDED RELEASE TRANSDERMAL at 18:55

## 2019-09-19 RX ADMIN — HEPARIN SODIUM 5000 UNIT(S): 5000 INJECTION INTRAVENOUS; SUBCUTANEOUS at 13:15

## 2019-09-19 RX ADMIN — PANTOPRAZOLE SODIUM 40 MILLIGRAM(S): 20 TABLET, DELAYED RELEASE ORAL at 13:09

## 2019-09-19 RX ADMIN — Medication 3 MILLILITER(S): at 22:30

## 2019-09-19 RX ADMIN — HEPARIN SODIUM 5000 UNIT(S): 5000 INJECTION INTRAVENOUS; SUBCUTANEOUS at 22:48

## 2019-09-19 RX ADMIN — Medication 3 MILLILITER(S): at 04:04

## 2019-09-19 RX ADMIN — Medication 3 MILLILITER(S): at 10:24

## 2019-09-19 RX ADMIN — HEPARIN SODIUM 5000 UNIT(S): 5000 INJECTION INTRAVENOUS; SUBCUTANEOUS at 05:19

## 2019-09-19 RX ADMIN — SCOPALAMINE 1 PATCH: 1 PATCH, EXTENDED RELEASE TRANSDERMAL at 17:53

## 2019-09-19 RX ADMIN — Medication 3 MILLILITER(S): at 15:49

## 2019-09-19 NOTE — GOALS OF CARE CONVERSATION - PERSONAL ADVANCE DIRECTIVE - CONVERSATION DETAILS
HCN: Spoke with grandtr. and discussed symptom management. She would like hospice at home.Consents signed. She will come this evening to be taught suctioning. DME to be delivered in am. Norma Lu

## 2019-09-19 NOTE — DISCHARGE NOTE PROVIDER - PROVIDER TOKENS
FREE:[LAST:[Daniella],FIRST:[Sophia],PHONE:[phone]],FAX:[(   )    -],ADDRESS:[[address] [place] [address]]]

## 2019-09-19 NOTE — PROGRESS NOTE ADULT - PROBLEM SELECTOR PLAN 4
On Humulin 70/30 at home with 25 units in AM and 15 units in evening. HbA1c 7.7. -137  - s/p NGT removal; ISS and FS q6

## 2019-09-19 NOTE — DISCHARGE NOTE PROVIDER - NSDCCPCAREPLAN_GEN_ALL_CORE_FT
PRINCIPAL DISCHARGE DIAGNOSIS  Diagnosis: Aspiration pneumonia  Assessment and Plan of Treatment:       SECONDARY DISCHARGE DIAGNOSES  Diagnosis: Dysphagia  Assessment and Plan of Treatment: PRINCIPAL DISCHARGE DIAGNOSIS  Diagnosis: Aspiration pneumonia  Assessment and Plan of Treatment: You came to the hospital in respiratory distress. You had a difficult time swallowing your secretions. This put you at risk for aspiration pneumonia. As a result, you were put on an antibiotic called Zosyn for 7 days. Your mental status and effort of breathing improved during your hospital stay. If you have symptoms like fever, chills, nausea, vomiting, or any other symptoms that concern you, please come back to the hospital.      SECONDARY DISCHARGE DIAGNOSES  Diagnosis: Dysphagia  Assessment and Plan of Treatment: You came to the hospital with symptoms that were concerning for dysphagia. You were unable to swallow your secretions. You were evaluated by the speech and swallow specialists and they recommended that you have nothing by mouth for the time being. The risk of aspiration was too high. As a result, a nasogastric tube was placed to temporarily give you nutrition. Your family decided that a PEG tube was not in your best interest and the NG tube was removed. Instead, it was in your best interest to pursue pleasure feeds with home hospice. Your family was taught on how to suction your secretions.    Diagnosis: Constipation  Assessment and Plan of Treatment: You came to the hospital with constipation. You had gone 7 days without having a bowel movement. You were provided colace, miralax, and lactulose to help you have a bowel movement. You were able to have a bowel movement with this regiment. After leaving the hospital, please continue to take miralax and colace for constipation. PRINCIPAL DISCHARGE DIAGNOSIS  Diagnosis: Aspiration pneumonia  Assessment and Plan of Treatment: You came to the hospital in respiratory distress. You had a difficult time swallowing your secretions. This put you at risk for aspiration pneumonia. As a result, you were put on an antibiotic called Zosyn for 7 days. Your mental status and effort of breathing improved during your hospital stay. If you have symptoms like fever, chills, nausea, vomiting, or any other symptoms that concern you, please come back to the hospital.      SECONDARY DISCHARGE DIAGNOSES  Diagnosis: Diabetes mellitus  Assessment and Plan of Treatment: You have a history of diabetes for which you take insulin at home. In the hospital, you have been receiving insulin based on your fingersticks. At home, do not continue using any insulin. Measure your blood glucose with fingersticks before every meal and before bedtime. Record these values in a journal and show them to your primary care provider. You can resume using insulin based on the recommendations of your primary care provider.    Diagnosis: Dysphagia  Assessment and Plan of Treatment: You came to the hospital with symptoms that were concerning for dysphagia. You were unable to swallow your secretions. You were evaluated by the speech and swallow specialists and they recommended that you have nothing by mouth for the time being. The risk of aspiration was too high. As a result, a nasogastric tube was placed to temporarily give you nutrition. Your family decided that a PEG tube was not in your best interest and the NG tube was removed. Instead, it was in your best interest to pursue pleasure feeds with home hospice. Your family was taught on how to suction your secretions.    Diagnosis: Constipation  Assessment and Plan of Treatment: You came to the hospital with constipation. You had gone 7 days without having a bowel movement. You were provided colace, miralax, and lactulose to help you have a bowel movement. You were able to have a bowel movement with this regiment. After leaving the hospital, please continue to take miralax and colace for constipation.

## 2019-09-19 NOTE — PROGRESS NOTE ADULT - PROBLEM SELECTOR PLAN 1
hx of dysphagia, on pureed diet at home. drooling on presentation, seems unable to swallow secretions. Improving mental status  - concern for inability to protect airway; MICU evaluated pt, not MICU candidate; ENT scoped, found laryngeal edema, pooling secretions at glottic opening  - as per ENT, decadron and PPI  - speech and swallow recommend pt remain NPO for now, aspiration precautions  - zofran PRN for nausea  - Family does not want PEG; palliative to reach out to family today  - s/p NGT removal    #Hiccups  persistent hiccups; improving, now intermittent  - trial of reglan 5mg q8 x 3 doses, renally dosed

## 2019-09-19 NOTE — PROGRESS NOTE ADULT - PROBLEM SELECTOR PLAN 4
After discussion of advanced directives family still wants pt to be full code, no feeding tube being pursued.   Plan is for pt to go home with hospice services, referral made.  GOC established, symptoms controlled.  Will sign off, reconsult as needed.

## 2019-09-19 NOTE — PROGRESS NOTE ADULT - ATTENDING COMMENTS
79M hx of alzheimer's dementia, HTN, PVD, CKD stage III, T2DM, blindness p/w 3 days of metabolic encephalopathy and respiratory distress and increased secretions, likely aspiration PNA.    #Metabolic Encephalopathy   - Possible CVA event: CT head unremarkable, but at this time patient will not be able to tolerate MRI due to copious secretions and tenuous respiratory status. Unable to get CTA Head/Neck due to elevated Cr.   - Completed 7 days Zosyn for likely aspiration event   - Patient without significant improvement in mental status, this may likely be new baseline due to progressing dementia vs. CVA event. Extensive discussions with family and palliative care. Hospice referral made, however patient to remain full code.     #Dysphagia/Aspiration   - S/S re-eval 9/13: NPO recommended. NG placed for feeding.  - Per discussions with Palliative, family does not want to pursue PEG and would like pleasure feeds. D/c NGT.     #CKD likely stage 4  - Baseline Cr 3.3 in Aug     #GOC - Palliative recs appreciated. Family would like to focus more on comfort and understand that patient has poor prognosis, however patient will remain full code at this time. Hospice referral made. 79M hx of alzheimer's dementia, HTN, PVD, CKD stage III, T2DM, blindness p/w 3 days of metabolic encephalopathy and respiratory distress and increased secretions, likely aspiration PNA.    #Metabolic Encephalopathy   - Possible CVA event: CT head unremarkable, but at this time patient will not be able to tolerate MRI and likely will not  at this time. Unable to get CTA Head/Neck due to elevated Cr.   - Completed 7 days Zosyn for likely aspiration event   - Patient without significant improvement in mental status, this may likely be new baseline due to progressing dementia vs. CVA event. Extensive discussions with family and palliative care. Hospice referral made, however patient to remain full code.     #Dysphagia/Aspiration   - S/S re-eval 9/13: NPO recommended. NG placed for feeding.  - Per discussions with Palliative, family does not want to pursue PEG and would like pleasure feeds. D/c NGT.     #CKD likely stage 4  - Baseline Cr 3.3 in Aug     #GOC - Palliative recs appreciated. Family would like to focus more on comfort and understand that patient has poor prognosis, however patient will remain full code at this time. Hospice referral made, pending dispo for home hospice.

## 2019-09-19 NOTE — PROGRESS NOTE ADULT - SUBJECTIVE AND OBJECTIVE BOX
Tawanda Mcdaniels, PGY-1  Internal Medicine  Pager [phone]    OVERNIGHT / SUBJECTIVE EVENTS:  -no acute events overnight  -Pt was seen and examined at bedside. Pt had no complaints. Denied f/c/n/v, CP, SOB, changes in vision, abdominal pain, dysuria, constipation, and diarrhea.    MEDICATIONS  (STANDING):  ALBUTerol/ipratropium for Nebulization 3 milliLiter(s) Nebulizer every 6 hours  dextrose 5%. 1000 milliLiter(s) (50 mL/Hr) IV Continuous <Continuous>  dextrose 50% Injectable 12.5 Gram(s) IV Push once  dextrose 50% Injectable 25 Gram(s) IV Push once  dextrose 50% Injectable 25 Gram(s) IV Push once  heparin  Injectable 5000 Unit(s) SubCutaneous every 8 hours  insulin lispro (HumaLOG) corrective regimen sliding scale   SubCutaneous every 6 hours  lactated ringers. 1000 milliLiter(s) (50 mL/Hr) IV Continuous <Continuous>  pantoprazole  Injectable 40 milliGRAM(s) IV Push daily    MEDICATIONS  (PRN):  acetaminophen    Suspension .. 650 milliGRAM(s) Oral every 6 hours PRN Mild Pain (1 - 3), Moderate Pain (4 - 6)  dextrose 40% Gel 15 Gram(s) Oral once PRN Blood Glucose LESS THAN 70 milliGRAM(s)/deciliter  glucagon  Injectable 1 milliGRAM(s) IntraMuscular once PRN Glucose LESS THAN 70 milligrams/deciliter  ondansetron Injectable 4 milliGRAM(s) IV Push every 6 hours PRN Nausea and/or Vomiting      Allergies    latex (Unknown)  No Known Drug Allergies    Intolerances        OBJECTIVE:  Vital Signs Last 24 Hrs  T(C): 36.4 (19 Sep 2019 05:10), Max: 36.4 (18 Sep 2019 13:15)  T(F): 97.5 (19 Sep 2019 05:10), Max: 97.6 (18 Sep 2019 22:01)  HR: 99 (19 Sep 2019 05:10) (19 - 99)  BP: 132/67 (19 Sep 2019 05:10) (122/45 - 132/67)  BP(mean): --  RR: 16 (19 Sep 2019 05:10) (16 - 16)  SpO2: 100% (19 Sep 2019 05:10) (96% - 100%)      09-17 @ 07:01  -  09-18 @ 07:00  --------------------------------------------------------  IN: 2245 mL / OUT: 310 mL / NET: 1935 mL    09-18 @ 07:01  -  09-19 @ 06:43  --------------------------------------------------------  IN: 300 mL / OUT: 400 mL / NET: -100 mL      CAPILLARY BLOOD GLUCOSE      POCT Blood Glucose.: 108 mg/dL (19 Sep 2019 05:18)  POCT Blood Glucose.: 137 mg/dL (18 Sep 2019 23:56)  POCT Blood Glucose.: 233 mg/dL (18 Sep 2019 17:40)  POCT Blood Glucose.: 225 mg/dL (18 Sep 2019 12:43)    I&O's Summary    17 Sep 2019 07:01  -  18 Sep 2019 07:00  --------------------------------------------------------  IN: 2245 mL / OUT: 310 mL / NET: 1935 mL    18 Sep 2019 07:01  -  19 Sep 2019 06:43  --------------------------------------------------------  IN: 300 mL / OUT: 400 mL / NET: -100 mL        PHYSICAL EXAM:  GENERAL: NAD, well-developed  HEAD:  Atraumatic, Normocephalic  EYES: EOMI, PERRLA, conjunctiva and sclera clear  NECK: Supple, No JVD  CHEST/LUNG: Clear to auscultation bilaterally; No wheeze  HEART: Regular rate and rhythm; No murmurs, rubs, or gallops  ABDOMEN: Soft, Nontender, Nondistended; Bowel sounds present  EXTREMITIES:  2+ Peripheral Pulses, No clubbing, cyanosis, or edema  PSYCH: AAOx3  SKIN: No rashes or lesions    LABS:    09-18    143  |  108<H>  |  76<H>  ----------------------------<  157<H>  4.9   |  23  |  3.97<H>    Ca    8.2<L>      18 Sep 2019 06:39  Phos  2.9     09-18  Mg     2.2     09-18        MICROBIOLOGY:     no new micro    RADIOLOGY & ADDITIONAL TESTS:  no new imaging    CONSULTS:  consult notes reviewed and discussed with respective teams

## 2019-09-19 NOTE — PROGRESS NOTE ADULT - SUBJECTIVE AND OBJECTIVE BOX
SUBJECTIVE AND OBJECTIVE: symptoms controlled.   INTERVAL HPI / OVERNIGHT EVENTS: plan is for hospice pt to be d/c with hospice services.      DNR on chart: full code  Allergies    latex (Unknown)  No Known Drug Allergies    Intolerances    MEDICATIONS  (STANDING):  ALBUTerol/ipratropium for Nebulization 3 milliLiter(s) Nebulizer every 6 hours  dextrose 5%. 1000 milliLiter(s) (50 mL/Hr) IV Continuous <Continuous>  dextrose 50% Injectable 12.5 Gram(s) IV Push once  dextrose 50% Injectable 25 Gram(s) IV Push once  dextrose 50% Injectable 25 Gram(s) IV Push once  heparin  Injectable 5000 Unit(s) SubCutaneous every 8 hours  insulin lispro (HumaLOG) corrective regimen sliding scale   SubCutaneous every 6 hours  lactated ringers. 1000 milliLiter(s) (50 mL/Hr) IV Continuous <Continuous>  pantoprazole  Injectable 40 milliGRAM(s) IV Push daily    MEDICATIONS  (PRN):  acetaminophen    Suspension .. 650 milliGRAM(s) Oral every 6 hours PRN Mild Pain (1 - 3), Moderate Pain (4 - 6)  acetaminophen  Suppository .. 650 milliGRAM(s) Rectal every 6 hours PRN Mild Pain (1 - 3)  dextrose 40% Gel 15 Gram(s) Oral once PRN Blood Glucose LESS THAN 70 milliGRAM(s)/deciliter  glucagon  Injectable 1 milliGRAM(s) IntraMuscular once PRN Glucose LESS THAN 70 milligrams/deciliter  HYDROmorphone  Injectable 0.5 milliGRAM(s) IV Push every 6 hours PRN Moderate Pain (4 - 6)  ondansetron Injectable 4 milliGRAM(s) IV Push every 6 hours PRN Nausea and/or Vomiting    ITEMS UNCHECKED ARE NOT PRESENT    PRESENT SYMPTOMS: [ x]Unable to obtain due to poor mentation   Source if other than patient:  [ ]Family   [ ]Team     Pain (Impact on QOL):    Location:  Minimal acceptable level (0-10 scale):                   Aggravating factors:  Quality:  Radiation:  Severity (0-10 scale):    Timing:    Dyspnea:                           [ ]Mild [ ]Moderate [ ]Severe  Anxiety:                             [ ]Mild [ ]Moderate [ ]Severe  Fatigue:                             [ ]Mild [ ]Moderate [ ]Severe  Nausea:                             [ ]Mild [ ]Moderate [ ]Severe  Loss of appetite:              [ ]Mild [ ]Moderate [ ]Severe  Constipation:                    [ ]Mild [ ]Moderate [ ]Severe    PAIN AD Score:	0  [email or web address] (Ctrl + left click to view)    Other Symptoms:  [ ]All other review of systems negative     Karnofsky Performance Score/Palliative Performance Status Version 2:  20 %    http://palliative.info/resource_material/[email or web address]    PHYSICAL EXAM:  Vital Signs Last 24 Hrs  T(C): 36.4 (19 Sep 2019 05:10), Max: 36.4 (18 Sep 2019 13:15)  T(F): 97.5 (19 Sep 2019 05:10), Max: 97.6 (18 Sep 2019 22:01)  HR: 93 (19 Sep 2019 10:24) (19 - 99)  BP: 132/67 (19 Sep 2019 05:10) (122/45 - 132/67)  BP(mean): --  RR: 16 (19 Sep 2019 05:10) (16 - 16)  SpO2: 98% (19 Sep 2019 10:24) (96% - 100%) I&O's Summary    18 Sep 2019 07:01  -  19 Sep 2019 07:00  --------------------------------------------------------  IN: 300 mL / OUT: 400 mL / NET: -100 mL     GENERAL:  [ ]Alert  [ ]Oriented x   [x ]Lethargic  [ ]Cachexia  [ ]Unarousable  [ ]Verbal  [ ]Non-Verbal  Behavioral:   [ ] Anxiety  [x ] Delirium [ ] Agitation [ ] Other  HEENT:  [ ]Normal   [x ]Dry mouth   [ ]ET Tube/Trach  [ ]Oral lesions  PULMONARY:   [x ]Clear [ ]Tachypnea  [ ]Audible excessive secretions   [ ]Rhonchi        [ ]Right [ ]Left [ ]Bilateral  [ ]Crackles        [ ]Right [ ]Left [ ]Bilateral  [ ]Wheezing     [ ]Right [ ]Left [ ]Bilateral  CARDIOVASCULAR:    [ x]Regular [ ]Irregular [ ]Tachy  [ ]Leighton [ ]Murmur [ ]Other  GASTROINTESTINAL:  [x ]Soft  [ ]Distended   [x ]+BS  [x ]Non tender [ ]Tender  [ ]PEG [ ]OGT/ NGT   Last BM:    GENITOURINARY:  [ ]Normal [ x] Incontinent   [ ]Oliguria/Anuria   [ ][place]u  MUSCULOSKELETAL:   [ ]Normal   [ ]Weakness  [ x]Bed/Wheelchair bound [ ]Edema  NEUROLOGIC:   [ ]No focal deficits  [x ] Cognitive impairment  [x ] Dysphagia [ ]Dysarthria [ ] Paresis [ ]Other   SKIN:   [ ]Normal   [ ]Pressure ulcer(s)  [ ]Rash    CRITICAL CARE:  [ ] Shock Present  [ ]Septic [ ]Cardiogenic [ ]Neurologic [ ]Hypovolemic  [ ]  Vasopressors [ ]  Inotropes   [ ] Respiratory failure present  [ ] Acute  [ ] Chronic [ ] Hypoxic  [ ] Hypercarbic [ ] Other  [ ] Other organ failure     GRIEF   [x ] Yes  [ ] No    LABS:  09-19    142  |  109<H>  |  74<H>  ----------------------------<  97  4.7   |  22  |  3.87<H>    Ca    8.1<L>      19 Sep 2019 06:05  Phos  3.2     09-19  Mg     2.2     09-19    RADIOLOGY & ADDITIONAL STUDIES: reviewed.     Protein Calorie Malnutrition Present: [ ] yes [ ] no  [ x] PPSV2 < or = 30%  [ ] significant weight loss [ ] poor nutritional intake [ ] anasarca [ ] catabolic state Albumin, Serum: 3.0 g/dL (09-11-19 @ 01:00)  Artificial Nutrition [ ]     REFERRALS:   [ ]Chaplaincy  [x ] Hospice  [ ]Child Life  [ ]Social Work  [ ]Case management [ ]Holistic Therapy     Goals of Care Document: DEE Sanchez (09-18-19 @ 12:16)  Goals of Care Conversation:   Participants:  · Participants  Patient; pt unable to participate secondary to dementia  · Child(hiren)  granddaughter Pauline Faith  · Provider  Dr. Franco  ·   Yelena Sanchez    Advance Directives:  · Does patient have Advance Directive  No  · Do you want to complete the HCP and name a Carlos Care Agent  No  · Does Patient Have a Surrogate  Yes  · Surrogate's Name  Pauline Faith  · Surrogate's Phone Number  [phone]  · Caregiver:  yes  · Name  Sheree cortez Netta  · Phone #  [phone]    Conversation Discussion:  · Conversation  Diagnosis; Prognosis; MOLST Discussed; Hospice Referral  · Conversation Details  Referral to palliative care for goals of care in the setting of advanced illness. Spoke to pt's granddaughter via telephone. Pt's spouse defers medical making to her granddaughter. Discussed pt's overall medical condition including a discussion regarding trajectory of disease, prognosis, and transition of care options. Pt's granddaughter appears to have a good understanding about her grandfather's medical condition and understands that his prognosis is poor.  She states that focusing on his comfort and quality if life is most important. Family are aware of pt's risk of aspiration and are in agreement that pt would not benefit from artificial nutrition as his overall quality of life or prognosis would likely not improve. Extensive discussion and education on comfort/pleasure feeds. Granddaughter in agreement that pt would benefit from pleasure/comfort feeds and understands that the NG tube will be removed. Discussed role and philosophy of hospice care and recommended hospice care for pt. Granddaughter in agreement; referral made to hospice care network. Pt resides at home with 24 hour care and has good family support.   Discussion regarding advanced care planning and preferences for end of life care. Discussed risk and benefit of resuscitative measures at the end of life including CPR and mechanical ventilation. Granddaughter does not feel pt would not benefit from aggressive resuscitative measures at the end of life such as CPR or mechanical ventilation however states that she needs to further discuss with pt's spouse and family prior to completing MOLST. At this time pt does remain full code. On going discussion with family regarding advance care planning.    What Matters Most To Patient and Family:  · What matters most to patient and family  To maximize pt's quality of life and focus on pt's comfort    Personal Advance Directives Treatment Guidelines:   Treatment Guidelines:  · Decision Maker  Surrogate  · Treatment Guidelines  No artificial nutrition  · Treatment Guideline Comments  Referral to hospice care network.      Electronic Signatures:  [place]elena [place] [place]  (Signed 18-Sep-2019 14:42)  	Authored: Goals of Care Conversation, Personal Advance Directives Treatment Guidelines      Last Updated: 18-Sep-2019 14:42 by Yelena Sanchez ([facility] [place]

## 2019-09-19 NOTE — DISCHARGE NOTE PROVIDER - HOSPITAL COURSE
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 Hx obtained from granddaughter at bedside. Prior to symptoms, pt able to converse, move around with assistance. 2 weeks ago, pt started having "chest spasms", granddaughter describes as hiccups. 3 days ago, pt started to complain of throat pain and became more somnolent, not speaking, becoming more bed bound, making gurgling sounds with breathing. Endorsed that pt afebrile at home. Had home health PA come evaluate pt. Granddaughter was told that CXR was clear and that chest spasms were due to alzheimer's medications, as a result pt's alzheimer's medications were held. Family decided to bring pt in today b/c symptoms were not improving. As per granddaughter, pt did not c/o any CP, abdominal pain, dysuria, n/v/d, prior to symptom onset.        Of note, granddaughter endorses that prior to symptoms, pt had no issues with swallowing, however, pt would eat pureed soups. From medical records brought by granddaughter, pt was advised to have a dysphagia level 2: mechanically altered, thin diet.        During hospitalization, pt was evaluated by MICU for concern of inability to protect airway. Pt was not a MICU candidate. ENT scoped the pt and found laryngeal edema c/w reflux, pooling of secretions at glottic opening, and poor respiratory effort. Pt was treated for aspiration PNA with zosyn for 7 days.        Speech and swallow evaluated the patient when admitted and again when mental status improved. Pt was recommended to remain NPO. NGT was placed and feeds started with titration of insulin and free water.        GOC discussion with family. No PEG, full code, home hospice. Patient is ready for discharge 9/20/19. 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 Hx obtained from granddaughter at bedside. Prior to symptoms, pt able to converse, move around with assistance. 2 weeks ago, pt started having "chest spasms", granddaughter describes as hiccups. 3 days ago, pt started to complain of throat pain and became more somnolent, not speaking, becoming more bed bound, making gurgling sounds with breathing. Endorsed that pt afebrile at home. Had home health PA come evaluate pt. Granddaughter was told that CXR was clear and that chest spasms were due to alzheimer's medications, as a result pt's alzheimer's medications were held. Family decided to bring pt in today b/c symptoms were not improving. As per granddaughter, pt did not c/o any CP, abdominal pain, dysuria, n/v/d, prior to symptom onset.        Of note, granddaughter endorses that prior to symptoms, pt had no issues with swallowing, however, pt would eat pureed soups. From medical records brought by granddaughter, pt was advised to have a dysphagia level 2: mechanically altered, thin diet.        During hospitalization, pt was evaluated by MICU for concern of inability to protect airway. Pt was not a MICU candidate. ENT scoped the pt and found laryngeal edema c/w reflux, pooling of secretions at glottic opening, and poor respiratory effort. Pt was treated for aspiration PNA with zosyn for 7 days.        Speech and swallow evaluated the patient when admitted and again when mental status improved. Pt was recommended to remain NPO. NGT was placed and feeds started with titration of insulin and free water.        GOC discussion with family with assistance of palliative care as patient without improvement in mental status this is likely new baseline 2/2 progressing dementia vs. CVA event. Per discussion, no PEG and pleasure feeds. Patient to remain full code but agreeable to home hospice. Patient is ready for discharge 9/20/19 home w/ home hospice.

## 2019-09-19 NOTE — PROGRESS NOTE ADULT - PROBLEM SELECTOR PLAN 1
+Dysphagia, likely 2/2 dementia. After GOC conversation with family, no PEG being pursued.  In the setting of dementia PEG/artificial nutrition not recommended, as it does not improve qol or prognosis. See goc note. How Severe Are Your Spot(S)?: moderate What Type Of Note Output Would You Prefer (Optional)?: Standard Output What Is The Reason For Today's Visit?: Full Body Skin Examination What Is The Reason For Today's Visit? (Being Monitored For X): concerning skin lesions on an annual basis

## 2019-09-19 NOTE — PROGRESS NOTE ADULT - PROBLEM SELECTOR PLAN 9
Discussed with granddaughter about code status. Currently full code.  - pt's wife endorsed that pt would want to be DNR/DNI, but family wants pt to be full code. We had a discussion of the consequences of being intubated as well as chest compressions, including being unable to come off the machine or death. Family understood and wants pt full code.   - As per palliative, discussion with granddaughter suggested placing pt on DNR/DNI and hospice care. However, discussion with granddaughter last night suggested that family still wants to keep full code. Will reach out to palliative today to clarify.  -Family does not want PEG.

## 2019-09-19 NOTE — PROGRESS NOTE ADULT - PROBLEM SELECTOR PLAN 3
Cr 3.8 on admission. Unknown baseline. Pt has hx of CKD stage 3. Stable.  - at discharge from [facility] one month ago, Cr was 3.3  - monitor Cr    #Hypernatremia, resolved  - continue to monitor

## 2019-09-19 NOTE — PROGRESS NOTE ADULT - PROBLEM/PLAN-9
11/19/2018         RE: Mirta Cardenas  [id number] [facility] [id number]        Dear Colleague,    Thank you for referring your patient, Mirta Cardenas, to the [facility]. Please see a copy of my visit note below.    Here today for NBUVB for psoriasis  Treatment # 16  No issues   photoproection on eyes, lips, nipples  1212 mj 2 minute 56 seconds today  Mineral oil applied  Goal 3 times weekly    Again, thank you for allowing me to participate in the care of your patient.        Sincerely,        Hailee Gale PA-C     DISPLAY PLAN FREE TEXT

## 2019-09-20 ENCOUNTER — TRANSCRIPTION ENCOUNTER (OUTPATIENT)
Age: 79
End: 2019-09-20

## 2019-09-20 VITALS
TEMPERATURE: 98 F | OXYGEN SATURATION: 99 % | RESPIRATION RATE: 17 BRPM | SYSTOLIC BLOOD PRESSURE: 117 MMHG | DIASTOLIC BLOOD PRESSURE: 67 MMHG | HEART RATE: 93 BPM

## 2019-09-20 LAB
GLUCOSE BLDC GLUCOMTR-MCNC: 134 MG/DL — HIGH (ref 70–99)
GLUCOSE BLDC GLUCOMTR-MCNC: 137 MG/DL — HIGH (ref 70–99)
GLUCOSE BLDC GLUCOMTR-MCNC: 137 MG/DL — HIGH (ref 70–99)
GLUCOSE BLDC GLUCOMTR-MCNC: 154 MG/DL — HIGH (ref 70–99)

## 2019-09-20 PROCEDURE — 99232 SBSQ HOSP IP/OBS MODERATE 35: CPT | Mod: GC

## 2019-09-20 RX ORDER — DOCUSATE SODIUM 100 MG
5 CAPSULE ORAL
Qty: 300 | Refills: 0
Start: 2019-09-20 | End: 2019-10-19

## 2019-09-20 RX ORDER — SENNA PLUS 8.6 MG/1
1 TABLET ORAL
Qty: 0 | Refills: 0 | DISCHARGE

## 2019-09-20 RX ORDER — LOSARTAN POTASSIUM 100 MG/1
1 TABLET, FILM COATED ORAL
Qty: 0 | Refills: 0 | DISCHARGE

## 2019-09-20 RX ORDER — INSULIN NPH HUM/REG INSULIN HM 70-30/ML
25 VIAL (ML) SUBCUTANEOUS
Qty: 0 | Refills: 0 | DISCHARGE

## 2019-09-20 RX ORDER — ERGOCALCIFEROL 1.25 MG/1
1 CAPSULE ORAL
Qty: 0 | Refills: 0 | DISCHARGE

## 2019-09-20 RX ORDER — INSULIN NPH HUM/REG INSULIN HM 70-30/ML
15 VIAL (ML) SUBCUTANEOUS
Qty: 0 | Refills: 0 | DISCHARGE

## 2019-09-20 RX ORDER — POLYETHYLENE GLYCOL 3350 17 G/17G
17 POWDER, FOR SOLUTION ORAL
Qty: 2 | Refills: 0
Start: 2019-09-20 | End: 2019-10-19

## 2019-09-20 RX ORDER — CLOPIDOGREL BISULFATE 75 MG/1
1 TABLET, FILM COATED ORAL
Qty: 0 | Refills: 0 | DISCHARGE

## 2019-09-20 RX ORDER — ROSUVASTATIN CALCIUM 5 MG/1
1 TABLET ORAL
Qty: 0 | Refills: 0 | DISCHARGE

## 2019-09-20 RX ORDER — DOCUSATE SODIUM 100 MG
1 CAPSULE ORAL
Qty: 60 | Refills: 0
Start: 2019-09-20 | End: 2019-10-19

## 2019-09-20 RX ORDER — SCOPALAMINE 1 MG/3D
1.3 PATCH, EXTENDED RELEASE TRANSDERMAL
Qty: 10 | Refills: 0
Start: 2019-09-20 | End: 2019-10-19

## 2019-09-20 RX ORDER — GABAPENTIN 400 MG/1
1 CAPSULE ORAL
Qty: 0 | Refills: 0 | DISCHARGE

## 2019-09-20 RX ORDER — DOCUSATE SODIUM 100 MG
1 CAPSULE ORAL
Qty: 0 | Refills: 0 | DISCHARGE

## 2019-09-20 RX ADMIN — HEPARIN SODIUM 5000 UNIT(S): 5000 INJECTION INTRAVENOUS; SUBCUTANEOUS at 05:49

## 2019-09-20 RX ADMIN — HYDROMORPHONE HYDROCHLORIDE 0.5 MILLIGRAM(S): 2 INJECTION INTRAMUSCULAR; INTRAVENOUS; SUBCUTANEOUS at 09:23

## 2019-09-20 RX ADMIN — Medication 3 MILLILITER(S): at 16:25

## 2019-09-20 RX ADMIN — HYDROMORPHONE HYDROCHLORIDE 0.5 MILLIGRAM(S): 2 INJECTION INTRAMUSCULAR; INTRAVENOUS; SUBCUTANEOUS at 09:40

## 2019-09-20 RX ADMIN — Medication 3 MILLILITER(S): at 10:35

## 2019-09-20 RX ADMIN — PANTOPRAZOLE SODIUM 40 MILLIGRAM(S): 20 TABLET, DELAYED RELEASE ORAL at 12:39

## 2019-09-20 RX ADMIN — SCOPALAMINE 1 PATCH: 1 PATCH, EXTENDED RELEASE TRANSDERMAL at 06:46

## 2019-09-20 RX ADMIN — SCOPALAMINE 1 PATCH: 1 PATCH, EXTENDED RELEASE TRANSDERMAL at 19:33

## 2019-09-20 RX ADMIN — Medication 1: at 01:04

## 2019-09-20 RX ADMIN — Medication 3 MILLILITER(S): at 05:01

## 2019-09-20 NOTE — PROGRESS NOTE ADULT - PROBLEM SELECTOR PROBLEM 4
DM (diabetes mellitus)
Palliative care by specialist

## 2019-09-20 NOTE — PROGRESS NOTE ADULT - PROBLEM SELECTOR PROBLEM 3
ELSA (acute kidney injury)
Late onset Alzheimer's disease without behavioral disturbance

## 2019-09-20 NOTE — PROGRESS NOTE ADULT - PROVIDER SPECIALTY LIST ADULT
Internal Medicine
Palliative Care
Internal Medicine
Internal Medicine

## 2019-09-20 NOTE — PROGRESS NOTE ADULT - PROBLEM SELECTOR PROBLEM 2
Metabolic encephalopathy
Dysphagia
Functional quadriplegia
Metabolic encephalopathy

## 2019-09-20 NOTE — PROGRESS NOTE ADULT - ASSESSMENT
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 Suspicion for aspiration PNA given pt's hx.
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 Palliative Care consulted for complex decision making in the setting of advanced dementia.
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 Suspicion for aspiration PNA given pt's hx.

## 2019-09-20 NOTE — PROGRESS NOTE ADULT - PROBLEM SELECTOR PROBLEM 6
Failure to thrive in adult

## 2019-09-20 NOTE — PROGRESS NOTE ADULT - ATTENDING COMMENTS
79M hx of alzheimer's dementia, HTN, PVD, CKD stage III, T2DM, blindness p/w 3 days of metabolic encephalopathy and respiratory distress and increased secretions, likely aspiration PNA.    #Metabolic Encephalopathy   - Possible CVA event: CT head unremarkable, but at this time patient will not be able to tolerate MRI and likely will not  at this time. Unable to get CTA Head/Neck due to elevated Cr.   - Completed 7 days Zosyn for likely aspiration event   - Patient without significant improvement in mental status, this may likely be new baseline due to progressing dementia vs. CVA event. Extensive discussions with family and palliative care. Hospice referral made, however patient to remain full code.     #Dysphagia/Aspiration   - S/S re-eval 9/13: NPO recommended. NG placed for feeding.  - Per discussions with Palliative, family does not want to pursue PEG and would like pleasure feeds. D/c'd NGT.     #CKD likely stage 4  - Baseline Cr 3.3 in Aug     #GOC - Palliative recs appreciated. Family would like to focus more on comfort and understand that patient has poor prognosis, however patient will remain full code at this time. Hospice referral made, pending dispo for home hospice once DME delivered.     Discussed w/ hospice. 79M hx of alzheimer's dementia, HTN, PVD, CKD stage III, T2DM, blindness p/w 3 days of metabolic encephalopathy and respiratory distress and increased secretions, likely aspiration PNA.    #Metabolic Encephalopathy   - Possible CVA event: CT head unremarkable, but at this time patient will not be able to tolerate MRI and likely will not  at this time. Unable to get CTA Head/Neck due to elevated Cr.   - Completed 7 days Zosyn for likely aspiration event   - Patient without significant improvement in mental status, this may likely be new baseline due to progressing dementia vs. CVA event. Extensive discussions with family and palliative care. Hospice referral made, however patient to remain full code.     #Dysphagia/Aspiration   - S/S re-eval 9/13: NPO recommended. NG placed for feeding.  - Per discussions with Palliative, family does not want to pursue PEG and would like pleasure feeds. D/c'd NGT.     #CKD likely stage 4  - Baseline Cr 3.3 in Aug     #GOC - Palliative recs appreciated. Family would like to focus more on comfort and understand that patient has poor prognosis, however patient will remain full code at this time. Hospice referral made, pending dispo for home hospice once DME delivered.     Discussed w/ hospice.    Patient stable for discharge home with hospice and HHA today. Family to come in for teaching of how to use suctioning.     37 minutes spent on discharge planning

## 2019-09-20 NOTE — PROGRESS NOTE ADULT - PROBLEM SELECTOR PROBLEM 7
Functional quadriplegia

## 2019-09-20 NOTE — PROGRESS NOTE ADULT - REASON FOR ADMISSION
respiratory distress and AMS

## 2019-09-20 NOTE — PROGRESS NOTE ADULT - PROBLEM SELECTOR PLAN 8
hx of alzheimers, not on any medications.   - see above plan for dysphagia

## 2019-09-20 NOTE — PROGRESS NOTE ADULT - SUBJECTIVE AND OBJECTIVE BOX
Tawanda Mcdaniels, PGY-1  Internal Medicine  Pager [phone]    OVERNIGHT / SUBJECTIVE EVENTS:  -no acute events overnight  -Pt was seen and examined at bedside. Pt had no complaints. Denied f/c/n/v, CP, SOB, changes in vision, abdominal pain, dysuria, constipation, and diarrhea.    MEDICATIONS  (STANDING):  ALBUTerol/ipratropium for Nebulization 3 milliLiter(s) Nebulizer every 6 hours  dextrose 5%. 1000 milliLiter(s) (50 mL/Hr) IV Continuous <Continuous>  dextrose 50% Injectable 12.5 Gram(s) IV Push once  dextrose 50% Injectable 25 Gram(s) IV Push once  dextrose 50% Injectable 25 Gram(s) IV Push once  heparin  Injectable 5000 Unit(s) SubCutaneous every 8 hours  insulin lispro (HumaLOG) corrective regimen sliding scale   SubCutaneous every 6 hours  pantoprazole  Injectable 40 milliGRAM(s) IV Push daily    MEDICATIONS  (PRN):  acetaminophen    Suspension .. 650 milliGRAM(s) Oral every 6 hours PRN Mild Pain (1 - 3), Moderate Pain (4 - 6)  acetaminophen  Suppository .. 650 milliGRAM(s) Rectal every 6 hours PRN Mild Pain (1 - 3)  dextrose 40% Gel 15 Gram(s) Oral once PRN Blood Glucose LESS THAN 70 milliGRAM(s)/deciliter  glucagon  Injectable 1 milliGRAM(s) IntraMuscular once PRN Glucose LESS THAN 70 milligrams/deciliter  HYDROmorphone  Injectable 0.5 milliGRAM(s) IV Push every 6 hours PRN Moderate Pain (4 - 6)  ondansetron Injectable 4 milliGRAM(s) IV Push every 6 hours PRN Nausea and/or Vomiting      Allergies    latex (Unknown)  No Known Drug Allergies    Intolerances        OBJECTIVE:  Vital Signs Last 24 Hrs  T(C): 36.9 (20 Sep 2019 05:44), Max: 36.9 (20 Sep 2019 05:44)  T(F): 98.4 (20 Sep 2019 05:44), Max: 98.4 (20 Sep 2019 05:44)  HR: 85 (20 Sep 2019 05:44) (72 - 94)  BP: 140/62 (20 Sep 2019 05:44) (105/40 - 140/62)  BP(mean): --  RR: 16 (20 Sep 2019 05:44) (16 - 17)  SpO2: 100% (20 Sep 2019 05:44) (97% - 100%)      CAPILLARY BLOOD GLUCOSE      POCT Blood Glucose.: 137 mg/dL (20 Sep 2019 05:55)  POCT Blood Glucose.: 154 mg/dL (20 Sep 2019 00:54)  POCT Blood Glucose.: 124 mg/dL (19 Sep 2019 17:38)  POCT Blood Glucose.: 117 mg/dL (19 Sep 2019 12:05)    I&O's Summary      PHYSICAL EXAM:  GENERAL: NAD, well-developed  HEAD:  Atraumatic, Normocephalic  EYES: EOMI, PERRLA, conjunctiva and sclera clear  NECK: Supple, No JVD  CHEST/LUNG: Clear to auscultation bilaterally; No wheeze  HEART: Regular rate and rhythm; No murmurs, rubs, or gallops  ABDOMEN: Soft, Nontender, Nondistended; Bowel sounds present  EXTREMITIES:  2+ Peripheral Pulses, No clubbing, cyanosis, or edema  PSYCH: AAOx3  SKIN: No rashes or lesions    LABS:    09-19    142  |  109<H>  |  74<H>  ----------------------------<  97  4.7   |  22  |  3.87<H>    Ca    8.1<L>      19 Sep 2019 06:05  Phos  3.2     09-19  Mg     2.2     09-19                          MICROBIOLOGY:     no new micro    RADIOLOGY & ADDITIONAL TESTS:  no new imaging    CONSULTS:  consult notes reviewed and discussed with respective teams Tawanda Mcdaniels, PGY-1  Internal Medicine  Pager [phone]    OVERNIGHT / SUBJECTIVE EVENTS:  -no acute events overnight  -Pt was seen and examined at bedside. Pt more alert and oriented this AM; AAOx2.    MEDICATIONS  (STANDING):  ALBUTerol/ipratropium for Nebulization 3 milliLiter(s) Nebulizer every 6 hours  dextrose 5%. 1000 milliLiter(s) (50 mL/Hr) IV Continuous <Continuous>  dextrose 50% Injectable 12.5 Gram(s) IV Push once  dextrose 50% Injectable 25 Gram(s) IV Push once  dextrose 50% Injectable 25 Gram(s) IV Push once  heparin  Injectable 5000 Unit(s) SubCutaneous every 8 hours  insulin lispro (HumaLOG) corrective regimen sliding scale   SubCutaneous every 6 hours  pantoprazole  Injectable 40 milliGRAM(s) IV Push daily    MEDICATIONS  (PRN):  acetaminophen    Suspension .. 650 milliGRAM(s) Oral every 6 hours PRN Mild Pain (1 - 3), Moderate Pain (4 - 6)  acetaminophen  Suppository .. 650 milliGRAM(s) Rectal every 6 hours PRN Mild Pain (1 - 3)  dextrose 40% Gel 15 Gram(s) Oral once PRN Blood Glucose LESS THAN 70 milliGRAM(s)/deciliter  glucagon  Injectable 1 milliGRAM(s) IntraMuscular once PRN Glucose LESS THAN 70 milligrams/deciliter  HYDROmorphone  Injectable 0.5 milliGRAM(s) IV Push every 6 hours PRN Moderate Pain (4 - 6)  ondansetron Injectable 4 milliGRAM(s) IV Push every 6 hours PRN Nausea and/or Vomiting      Allergies    latex (Unknown)  No Known Drug Allergies    Intolerances        OBJECTIVE:  Vital Signs Last 24 Hrs  T(C): 36.9 (20 Sep 2019 05:44), Max: 36.9 (20 Sep 2019 05:44)  T(F): 98.4 (20 Sep 2019 05:44), Max: 98.4 (20 Sep 2019 05:44)  HR: 85 (20 Sep 2019 05:44) (72 - 94)  BP: 140/62 (20 Sep 2019 05:44) (105/40 - 140/62)  BP(mean): --  RR: 16 (20 Sep 2019 05:44) (16 - 17)  SpO2: 100% (20 Sep 2019 05:44) (97% - 100%)      CAPILLARY BLOOD GLUCOSE      POCT Blood Glucose.: 137 mg/dL (20 Sep 2019 05:55)  POCT Blood Glucose.: 154 mg/dL (20 Sep 2019 00:54)  POCT Blood Glucose.: 124 mg/dL (19 Sep 2019 17:38)  POCT Blood Glucose.: 117 mg/dL (19 Sep 2019 12:05)    I&O's Summary      PHYSICAL EXAM:  GENERAL: NAD, well-developed  HEAD:  Atraumatic, Normocephalic  CHEST/LUNG: Clear to auscultation bilaterally in anterior fields; No wheeze  HEART: Regular rate and rhythm; No murmurs, rubs, or gallops  ABDOMEN: Soft, Nontender, Nondistended; Bowel sounds present  EXTREMITIES:  No peripheral edema  PSYCH: AAOx2      LABS:    09-19    142  |  109<H>  |  74<H>  ----------------------------<  97  4.7   |  22  |  3.87<H>    Ca    8.1<L>      19 Sep 2019 06:05  Phos  3.2     09-19  Mg     2.2     09-19                          MICROBIOLOGY:     no new micro    RADIOLOGY & ADDITIONAL TESTS:  no new imaging    CONSULTS:  consult notes reviewed and discussed with respective teams

## 2019-09-20 NOTE — PROGRESS NOTE ADULT - PROBLEM SELECTOR PROBLEM 1
Metabolic encephalopathy
Dysphagia
Failure to thrive in adult
Metabolic encephalopathy
Dysphagia

## 2019-09-20 NOTE — PROGRESS NOTE ADULT - PROBLEM SELECTOR PLAN 1
hx of dysphagia, on pureed diet at home. drooling on presentation, seems unable to swallow secretions. Improving mental status  - concern for inability to protect airway; MICU evaluated pt, not MICU candidate; ENT scoped, found laryngeal edema, pooling secretions at glottic opening  - as per ENT, s/p decadron and c/w PPI  - speech and swallow recommend pt remain NPO for now, aspiration precautions  - zofran PRN for nausea  - Family does not want PEG; pleasure feeds with home hospice  - s/p NGT removal    #Hiccups  persistent hiccups; improving, now intermittent  - trial of reglan 5mg q8 x 3 doses, renally dosed

## 2019-09-20 NOTE — PROGRESS NOTE ADULT - PROBLEM SELECTOR PLAN 3
Cr 3.8 on admission. Unknown baseline. Pt has hx of CKD stage 3. Stable.  - at discharge from [facility] one month ago, Cr was 3.3  - monitor Cr    #Hypernatremia, resolved  - continue to monitor

## 2019-09-20 NOTE — PROGRESS NOTE ADULT - PROBLEM SELECTOR PLAN 7
pt needs assistance with ADL. Lives at home with wife, has home health aide. before admission, able to ambulate with walker
pt needs assistance with ADL. Lives at home with wife, has home health aide.
pt needs assistance with ADL. Lives at home with wife, has home health aide. before admission, able to ambulate with walker

## 2019-09-24 RX ORDER — SCOPALAMINE 1 MG/3D
1.3 PATCH, EXTENDED RELEASE TRANSDERMAL
Qty: 10 | Refills: 0
Start: 2019-09-24 | End: 2019-10-23

## 2020-01-28 NOTE — DIETITIAN INITIAL EVALUATION ADULT. - 30 CAL TO
1530 Hydroxychloroquine Counseling:  I discussed with the patient that a baseline ophthalmologic exam is needed at the start of therapy and every year thereafter while on therapy. A CBC may also be warranted for monitoring.  The side effects of this medication were discussed with the patient, including but not limited to agranulocytosis, aplastic anemia, seizures, rashes, retinopathy, and liver toxicity. Patient instructed to call the office should any adverse effect occur.  The patient verbalized understanding of the proper use and possible adverse effects of Plaquenil.  All the patient's questions and concerns were addressed.

## 2020-04-28 NOTE — PATIENT PROFILE ADULT - FUNCTIONAL SCREEN CURRENT LEVEL: BATHING, MLM
PT HAS AN APPT ON 6/29/20 WITH DR WALKER PT IS CONCERNED ABOUT HIS WBC AND WOULD LIKE TO GET IN SOMETIME IN MAY IF POSSIBLE     PT CONTACT # [phone]   4 = completely dependent

## 2023-11-11 NOTE — PROGRESS NOTE ADULT - PROBLEM SELECTOR PLAN 10
Pt resting in bed, no distress noted, will continue to monitor.      Loi Bourgeois RN  11/11/23 8107 DVT ppx: hep sq  diet: NGT, glucerna 1.2

## 2024-06-17 NOTE — PROGRESS NOTE ADULT - PROBLEM SELECTOR PLAN 2
ROXANNE    STEPHANIE: 8/3/2023 - Dr. Horne     CC: Pt is here today for a routine eye exam. Pt states that he noticed a   decline with his near and distance vision since his last exam.     (-)Dryness  (-)Burning  (-)Itchiness  (-)Tearing  (+)Flashes  (+)Floaters   (+)Photophobia  (-)Eye Pain      Diabetic: yes  A1C: 6.3 on 8/29/2023    Contact Lens: no    Eye Meds: no    PD: 62.0    Last edited by Dorothy Solis MA on 6/17/2024  2:49 PM.            Assessment /Plan     For exam results, see Encounter Report.    There are no diagnoses linked to this encounter.                      hx of dysphagia, on pureed diet at home. drooling on presentation, seems unable to swallow secretions. Improving with better mental status today  - concern for inability to protect airway; MICU evaluated pt, not MICU candidate; ENT scoped, found laryngeal edema, pooling secretions at glottic opening  - as per ENT, decadron and PPI  - speech and swallow to re evaluate pt today as mental status improving; NPO for now, aspiration precautions  - zofran PRN for nausea    #Hiccups  persistent hiccups  - trial of reglan 5mg q8 x 3 doses, renally dosed